# Patient Record
Sex: FEMALE | Race: WHITE | NOT HISPANIC OR LATINO | Employment: OTHER | URBAN - METROPOLITAN AREA
[De-identification: names, ages, dates, MRNs, and addresses within clinical notes are randomized per-mention and may not be internally consistent; named-entity substitution may affect disease eponyms.]

---

## 2017-01-19 ENCOUNTER — OFFICE VISIT (OUTPATIENT)
Dept: NEPHROLOGY | Facility: MEDICAL CENTER | Age: 82
End: 2017-01-19
Payer: MEDICARE

## 2017-01-19 VITALS
RESPIRATION RATE: 14 BRPM | WEIGHT: 142 LBS | DIASTOLIC BLOOD PRESSURE: 74 MMHG | HEIGHT: 59 IN | SYSTOLIC BLOOD PRESSURE: 132 MMHG | HEART RATE: 78 BPM | BODY MASS INDEX: 28.63 KG/M2 | TEMPERATURE: 97.6 F

## 2017-01-19 DIAGNOSIS — E55.9 VITAMIN D DEFICIENCY DISEASE: ICD-10-CM

## 2017-01-19 DIAGNOSIS — N18.30 CHRONIC KIDNEY DISEASE, STAGE 3, MOD DECREASED GFR (HCC): ICD-10-CM

## 2017-01-19 DIAGNOSIS — D64.9 ANEMIA, UNSPECIFIED TYPE: ICD-10-CM

## 2017-01-19 DIAGNOSIS — I10 ESSENTIAL HYPERTENSION: ICD-10-CM

## 2017-01-19 LAB
25(OH)D3+25(OH)D2 SERPL-MCNC: 27.4 NG/ML (ref 30–100)
APPEARANCE UR: ABNORMAL
BACTERIA #/AREA URNS HPF: ABNORMAL /[HPF]
BILIRUB UR QL STRIP: NEGATIVE
BUN SERPL-MCNC: 23 MG/DL (ref 10–36)
BUN/CREAT SERPL: 17 (ref 11–26)
CALCIUM SERPL-MCNC: 9.3 MG/DL (ref 8.7–10.3)
CASTS URNS MICRO: ABNORMAL
CASTS URNS QL MICRO: PRESENT /LPF
CHLORIDE SERPL-SCNC: 109 MMOL/L (ref 96–106)
CO2 SERPL-SCNC: 16 MMOL/L (ref 18–29)
COLOR UR: YELLOW
CREAT SERPL-MCNC: 1.39 MG/DL (ref 0.57–1)
CRYSTALS URNS MICRO: ABNORMAL
EPI CELLS #/AREA URNS HPF: ABNORMAL /HPF
ERYTHROCYTE [DISTWIDTH] IN BLOOD BY AUTOMATED COUNT: 15 % (ref 12.3–15.4)
GLUCOSE SERPL-MCNC: 98 MG/DL (ref 65–99)
GLUCOSE UR QL: NEGATIVE
HCT VFR BLD AUTO: 37.7 % (ref 34–46.6)
HGB BLD-MCNC: 12.1 G/DL (ref 11.1–15.9)
HGB UR QL STRIP: NEGATIVE
KETONES UR QL STRIP: NEGATIVE
LEUKOCYTE ESTERASE UR QL STRIP: ABNORMAL
MCH RBC QN AUTO: 32.5 PG (ref 26.6–33)
MCHC RBC AUTO-ENTMCNC: 32.1 G/DL (ref 31.5–35.7)
MCV RBC AUTO: 101 FL (ref 79–97)
MICRO URNS: ABNORMAL
MUCOUS THREADS URNS QL MICRO: PRESENT
NITRITE UR QL STRIP: POSITIVE
NRBC BLD AUTO-RTO: ABNORMAL %
PH UR STRIP: 6 [PH] (ref 5–7.5)
PLATELET # BLD AUTO: 228 X10E3/UL (ref 150–379)
POTASSIUM SERPL-SCNC: 3.9 MMOL/L (ref 3.5–5.2)
PROT UR QL STRIP: ABNORMAL
RBC # BLD AUTO: 3.72 X10E6/UL (ref 3.77–5.28)
RBC #/AREA URNS HPF: ABNORMAL /HPF
RENAL EPI CELLS #/AREA URNS HPF: ABNORMAL /[HPF]
SODIUM SERPL-SCNC: 143 MMOL/L (ref 134–144)
SP GR UR: 1.02 (ref 1–1.03)
T VAGINALIS URNS QL MICRO: ABNORMAL
UNIDENT CRYS URNS QL MICRO: ABNORMAL
URNS CMNT MICRO: ABNORMAL
UROBILINOGEN UR STRIP-MCNC: 0.2 MG/DL (ref 0.2–1)
WBC # BLD AUTO: 4.6 X10E3/UL (ref 3.4–10.8)
WBC #/AREA URNS HPF: >30 /HPF
YEAST #/AREA URNS HPF: ABNORMAL /[HPF]

## 2017-01-19 PROCEDURE — 99214 OFFICE O/P EST MOD 30 MIN: CPT | Performed by: INTERNAL MEDICINE

## 2017-01-19 NOTE — PROGRESS NOTES
"Subjective:      Kendra Murphy is a 89 y.o. female who presents with Follow-Up and Chronic Kidney Disease          HPI  Kendra is coming today for f/u CKD III,  HTN.  No complaints, doing well.  Good appetite and energy level.  No difficulties to urinate, no dysuria/hematuria/flank pain.  BP well controlled  Serum creatinine level stable at baseline        Review of Systems   Constitutional: Negative for fever, chills, weight loss and malaise/fatigue.   HENT: Negative for congestion, sore throat and neck pain.    Eyes: Negative for blurred vision and pain.   Respiratory: Negative for cough, hemoptysis, sputum production, shortness of breath and wheezing.    Cardiovascular: Negative for chest pain, palpitations, orthopnea, claudication, leg swelling and PND.   Gastrointestinal: Negative for nausea, vomiting, abdominal pain. Positive for diarrhea.   Genitourinary: Negative for dysuria, urgency, frequency, hematuria and flank pain.   Musculoskeletal: Positive for joint pain. Negative for myalgias and back pain.   Skin: Negative for itching and rash.   Neurological: Negative for dizziness, tremors, sensory change, focal weakness and headaches.     PMH/FH/SH/allergoes and medications reviewed     Objective:     /74 mmHg  Pulse 78  Temp(Src) 36.4 °C (97.6 °F) (Temporal)  Resp 14  Ht 1.499 m (4' 11\")  Wt 64.411 kg (142 lb)  BMI 28.67 kg/m2  LMP 01/01/1970     Physical Exam   Constitutional: She is oriented to person, place, and time. She appears well-developed and well-nourished. No distress.   HENT:   Head: Normocephalic and atraumatic.   Nose: Nose normal.   Mouth/Throat: Oropharynx is clear and moist. No oropharyngeal exudate.   Eyes: Conjunctivae and EOM are normal. Pupils are equal, round, and reactive to light. No scleral icterus.   Neck: Normal range of motion. No thyromegaly present.   Cardiovascular: Normal rate and normal heart sounds.  Exam reveals no gallop and no friction rub.  "   Pulmonary/Chest: Effort normal and breath sounds normal. No respiratory distress. She has no wheezes. She has no rales.   Abdominal: Soft. Bowel sounds are normal. She exhibits no distension and no mass. There is no tenderness.   Musculoskeletal: She exhibits no edema and no tenderness.   Lymphadenopathy:     She has no cervical adenopathy.   Neurological: She is alert and oriented to person, place, and time. No cranial nerve deficit. Coordination normal.   Skin: Skin is warm. No rash noted. She is not diaphoretic. No erythema.             Laboratory results; reviewed d/w Pt.  Lab Results   Component Value Date/Time    CREATININE 2.20* 9/27/2012 11:04 AM    CREATININE 1.9* 2/11/2009  3:50 AM    POTASSIUM 4.9 9/27/2012 11:04 AM     Lab Results   Component Value Date/Time    CREATININE 1.49* 4/10/2013  9:25 AM    CREATININE 2.20* 9/27/2012 11:04 AM    POTASSIUM 4.2 4/10/2013  9:25 AM     Lab Results   Component Value Date/Time    CREATININE 1.32* 7/30/2013 11:25 AM    CREATININE 1.49* 4/10/2013  9:25 AM    CREATININE 2.20* 9/27/2012 11:04 AM    POTASSIUM 3.8 7/30/2013 11:25 AM    POTASSIUM 4.2 4/10/2013  9:25 AM     Lab Results   Component Value Date/Time    CREATININE 1.40* 10/30/2013  2:00 PM    CREATININE 1.49* 4/10/2013  9:25 AM    CREATININE 2.20* 9/27/2012 11:04 AM    POTASSIUM 3.7 10/30/2013  2:00 PM    POTASSIUM 4.2 4/10/2013  9:25 AM       Lab Results   Component Value Date/Time    CREATININE 1.37* 3/10/2014 11:11 AM    CREATININE 1.49* 4/10/2013  9:25 AM    CREATININE 2.20* 9/27/2012 11:04 AM    POTASSIUM 3.6 3/10/2014 11:11 AM    POTASSIUM 4.2 4/10/2013  9:25 AM     Lab Results   Component Value Date/Time    CREATININE 1.53* 6/4/2014 11:53 AM    CREATININE 1.49* 4/10/2013  9:25 AM    CREATININE 2.20* 9/27/2012 11:04 AM    POTASSIUM 4.5 6/4/2014 11:53 AM    POTASSIUM 4.2 4/10/2013  9:25 AM     Lab Results   Component Value Date/Time    CREATININE 1.44* 9/26/2014 10:54 AM    CREATININE 1.49* 4/10/2013  9:25  AM    CREATININE 2.20* 9/27/2012 11:04 AM    POTASSIUM 4.2 9/26/2014 10:54 AM    POTASSIUM 4.2 4/10/2013  9:25 AM     Lab Results   Component Value Date/Time    CREATININE 1.39* 2/9/2015 11:05 AM    CREATININE 1.49* 4/10/2013  9:25 AM    CREATININE 2.20* 9/27/2012 11:04 AM    POTASSIUM 4.3 2/9/2015 11:05 AM    POTASSIUM 4.2 4/10/2013  9:25 AM     Lab Results   Component Value Date/Time    CREATININE 1.33* 6/5/2015 10:41 AM    CREATININE 1.49* 4/10/2013  9:25 AM    CREATININE 2.20* 9/27/2012 11:04 AM    POTASSIUM 4.6 6/5/2015 10:41 AM    POTASSIUM 4.2 4/10/2013  9:25 AM       Lab Results   Component Value Date/Time    CREATININE 1.39* 01/18/2017 10:03 AM    POTASSIUM 3.9 01/18/2017 10:03 AM                 Assessment:     1.CKD III -stable   2.HTN: BP well controlled  3.Electrolytes: well controlled  4.Anemia: Hb stable  5.Hyperlipidemia:advised f/u low cholesterol diet  6.Volume:euvolemic  7.Vit D def: continue supp;ementation  8.?UTI-asymptomatic    Plan:   1.Continue current treatment  2.Monitor BP and bring readings with next visit  3.Diet : low salt , low cholesterol  4.Repeat UA and urine culture  F/u in 3 months with BMP, UA

## 2017-01-19 NOTE — MR AVS SNAPSHOT
"        Kendra Murphy   2017 10:00 AM   Office Visit   MRN: [id number]    Department:  [facility]   Dept Phone:  [phone]    Description:  Female : 1926   Provider:  Caitie Orr M.D.           Reason for Visit     Follow-Up           Allergies as of 2017     Allergen Noted Reactions    Chocolate 2016   Diarrhea    .    Neomycin-Polymyxin B 02/10/2009         You were diagnosed with     Chronic kidney disease, stage 3, mod decreased GFR   [583293]       Essential hypertension   [4549949]       Vitamin D deficiency disease   [949684]       Anemia, unspecified type   [6203076]         Vital Signs     Blood Pressure Pulse Temperature Respirations Height Weight    132/74 mmHg 78 36.4 °C (97.6 °F) (Temporal) 14 1.499 m (4' 11\") 64.411 kg (142 lb)    Body Mass Index Last Menstrual Period Smoking Status             28.67 kg/m2 1970 Never Smoker          Basic Information     Date Of Birth Sex Race Ethnicity Preferred Language    1926 Female White Non- English      Your appointments     2017 11:30 AM   Follow Up Visit with Caitie Orr M.D.   [facility] ([address] [place] [address])    [facility] [address] [place] [address], [address]  [facility] [id number] [phone]           You will be receiving a confirmation call a few days before your appointment from our automated call confirmation system.              Problem List              ICD-10-CM Priority Class Noted - Resolved    Colon polyp K63.5   Unknown - Present    Osteopenia M85.80   Unknown - Present    Osteoarthritis M19.90   Unknown - Present    Acid reflux K21.9   Unknown - Present    Allergic rhinitis J30.9   2010 - Present    Rosacea L71.9   2012 - Present    Chronic kidney disease, stage 3, mod decreased GFR N18.3   2012 - Present    Anemia D64.9   2012 - Present    HTN (hypertension) I10   2012 - Present    Vitamin D deficiency disease E55.9   10/2/2014 - Present   " Lymphocytic colitis K52.832   4/24/2014 - Present      Health Maintenance        Date Due Completion Dates    PAP SMEAR [birth year]/1947 ---    IMM ZOSTER VACCINE 7/24/1986 ---    IMM DTaP/Tdap/Td Vaccine (1 - Tdap) 10/12/2011 10/11/2011    MAMMOGRAM 10/2/2015 10/2/2014 (Declined), 9/27/2012 (Declined), 5/1/2008, 5/1/2008    Override on 10/2/2014: Patient Declined    Override on 9/27/2012: Patient Declined    IMM INFLUENZA (1) 9/1/2016 10/10/2012, 10/11/2011    IMM PNEUMOCOCCAL 65+ (ADULT) LOW/MEDIUM RISK SERIES (2 of 2 - PCV13) 1/19/2017 1/19/2016    BONE DENSITY 9/27/2017 9/27/2012 (Declined), 5/1/2008    Override on 9/27/2012: Patient Declined    COLONOSCOPY 4/10/2024 4/10/2014, 11/2/2007            Current Immunizations     Influenza LAIV (Nasal) 10/10/2012    Influenza TIV (IM) 10/11/2011  1:59 PM    Pneumococcal Vaccine (UF)Historical Data 2/11/2009 11:30 AM    Pneumococcal polysaccharide vaccine (PPSV-23) 1/19/2016  6:13 PM    TD Vaccine 10/11/2011  1:55 PM      Below and/or attached are the medications your provider expects you to take. Review all of your home medications and newly ordered medications with your provider and/or pharmacist. Follow medication instructions as directed by your provider and/or pharmacist. Please keep your medication list with you and share with your provider. Update the information when medications are discontinued, doses are changed, or new medications (including over-the-counter products) are added; and carry medication information at all times in the event of emergency situations     Allergies:  CHOCOLATE - Diarrhea     NEOMYCIN-POLYMYXIN B - (reactions not documented)               Medications  Valid as of: January 19, 2017 - 10:39 AM    Generic Name Brand Name Tablet Size Instructions for use    Acetaminophen (Tab) Acetaminophen 650 MG Take 1,300 mg by mouth every 6 hours as needed. Indications: Pain        Acetaminophen-Codeine (Tab) Acetaminophen-Codeine 300-30 MG Take 1 Tab by  mouth every four hours as needed.        AmLODIPine Besylate (Tab) NORVASC 5 MG Take 1 Tab by mouth every day.        AmLODIPine Besylate (Tab) NORVASC 5 MG TAKE 1 TABLET BY MOUTH EVERY DAY        Ascorbic Acid   Take 1 Tab by mouth every day.        B Complex Vitamins   Take 1 Tab by mouth every day.        Calcium Citrate-Vitamin D   Take 1 Tab by mouth every day.        Cholestyramine Light (Pack) QUESTRAN,PREVALITE 4 GM Take 4 g by mouth every day.        Esomeprazole Magnesium (Pack) NEXIUM 40 MG Take 40 mg by mouth every day.        MetroNIDAZOLE (Gel) METROGEL 1 % Apply 1 Application to affected area(s) every day.        Potassium   Take 595 mg by mouth every day.        .                 Medicines prescribed today were sent to:     [facility]/PHARMACY #[id number] - [place], [place] - [address]    [address] [place] [address]    Phone: [phone] Fax: [phone]    Open 24 Hours?: No      Medication refill instructions:       If your prescription bottle indicates you have medication refills left, it is not necessary to call your provider’s office. Please contact your pharmacy and they will refill your medication.    If your prescription bottle indicates you do not have any refills left, you may request refills at any time through one of the following ways: The online [employer] system (except Urgent Care), by calling your provider’s office, or by asking your pharmacy to contact your provider’s office with a refill request. Medication refills are processed only during regular business hours and may not be available until the next business day. Your provider may request additional information or to have a follow-up visit with you prior to refilling your medication.   *Please Note: Medication refills are assigned a new Rx number when refilled electronically. Your pharmacy may indicate that no refills were authorized even though a new prescription for the same medication is available at the pharmacy. Please  request the medicine by name with the pharmacy before contacting your provider for a refill.        Your To Do List     Future Labs/Procedures Complete By Expires    BASIC METABOLIC PANEL  As directed 7/19/2017    URINALYSIS,CULTURE IF INDICATED  As directed 1/19/2018    URINALYSIS  As directed 7/19/2017         MyChart Access Code: Activation code not generated  Current MyChart Status: Active

## 2017-04-02 LAB
25(OH)D3+25(OH)D2 SERPL-MCNC: 27.7 NG/ML (ref 30–100)
APPEARANCE UR: ABNORMAL
BACTERIA #/AREA URNS HPF: ABNORMAL /[HPF]
BACTERIA UR CULT: ABNORMAL
BACTERIA UR CULT: ABNORMAL
BILIRUB UR QL STRIP: NEGATIVE
BUN SERPL-MCNC: 22 MG/DL (ref 10–36)
BUN/CREAT SERPL: 18 (ref 11–26)
CALCIUM SERPL-MCNC: 9.2 MG/DL (ref 8.7–10.3)
CASTS URNS MICRO: ABNORMAL
CASTS URNS QL MICRO: ABNORMAL
CHLORIDE SERPL-SCNC: 107 MMOL/L (ref 96–106)
CO2 SERPL-SCNC: 21 MMOL/L (ref 18–29)
COLOR UR: YELLOW
CREAT SERPL-MCNC: 1.22 MG/DL (ref 0.57–1)
CRYSTALS URNS MICRO: ABNORMAL
EPI CELLS #/AREA URNS HPF: ABNORMAL /HPF
GLUCOSE SERPL-MCNC: 105 MG/DL (ref 65–99)
GLUCOSE UR QL: NEGATIVE
HGB UR QL STRIP: NEGATIVE
KETONES UR QL STRIP: NEGATIVE
LEUKOCYTE ESTERASE UR QL STRIP: ABNORMAL
MICRO URNS: ABNORMAL
MICRO URNS: ABNORMAL
MUCOUS THREADS URNS QL MICRO: PRESENT
NITRITE UR QL STRIP: POSITIVE
OTHER ANTIBIOTIC SUSC ISLT: ABNORMAL
PH UR STRIP: 6 [PH] (ref 5–7.5)
POTASSIUM SERPL-SCNC: 3.8 MMOL/L (ref 3.5–5.2)
PROT UR QL STRIP: ABNORMAL
RBC #/AREA URNS HPF: ABNORMAL /HPF
RENAL EPI CELLS #/AREA URNS HPF: ABNORMAL /[HPF]
SODIUM SERPL-SCNC: 144 MMOL/L (ref 134–144)
SP GR UR: 1.03 (ref 1–1.03)
T VAGINALIS URNS QL MICRO: ABNORMAL
UNIDENT CRYS URNS QL MICRO: ABNORMAL
URINALYSIS REFLEX  377202: ABNORMAL
URNS CMNT MICRO: ABNORMAL
UROBILINOGEN UR STRIP-MCNC: 0.2 MG/DL (ref 0.2–1)
WBC #/AREA URNS HPF: >30 /HPF
YEAST #/AREA URNS HPF: ABNORMAL /[HPF]

## 2017-04-06 ENCOUNTER — OFFICE VISIT (OUTPATIENT)
Dept: NEPHROLOGY | Facility: MEDICAL CENTER | Age: 82
End: 2017-04-06
Payer: MEDICARE

## 2017-04-06 VITALS
RESPIRATION RATE: 16 BRPM | SYSTOLIC BLOOD PRESSURE: 122 MMHG | HEART RATE: 81 BPM | WEIGHT: 138 LBS | BODY MASS INDEX: 27.82 KG/M2 | OXYGEN SATURATION: 98 % | DIASTOLIC BLOOD PRESSURE: 80 MMHG | TEMPERATURE: 97.6 F | HEIGHT: 59 IN

## 2017-04-06 DIAGNOSIS — N39.0 URINARY TRACT INFECTION, SITE UNSPECIFIED: ICD-10-CM

## 2017-04-06 DIAGNOSIS — N18.30 CHRONIC KIDNEY DISEASE, STAGE 3, MOD DECREASED GFR (HCC): ICD-10-CM

## 2017-04-06 DIAGNOSIS — E55.9 VITAMIN D DEFICIENCY DISEASE: ICD-10-CM

## 2017-04-06 DIAGNOSIS — I10 ESSENTIAL HYPERTENSION: ICD-10-CM

## 2017-04-06 PROCEDURE — G8432 DEP SCR NOT DOC, RNG: HCPCS | Performed by: INTERNAL MEDICINE

## 2017-04-06 PROCEDURE — 4040F PNEUMOC VAC/ADMIN/RCVD: CPT | Performed by: INTERNAL MEDICINE

## 2017-04-06 PROCEDURE — 1036F TOBACCO NON-USER: CPT | Performed by: INTERNAL MEDICINE

## 2017-04-06 PROCEDURE — G8419 CALC BMI OUT NRM PARAM NOF/U: HCPCS | Performed by: INTERNAL MEDICINE

## 2017-04-06 PROCEDURE — 1101F PT FALLS ASSESS-DOCD LE1/YR: CPT | Mod: 8P | Performed by: INTERNAL MEDICINE

## 2017-04-06 PROCEDURE — 99214 OFFICE O/P EST MOD 30 MIN: CPT | Performed by: INTERNAL MEDICINE

## 2017-04-06 NOTE — PROGRESS NOTES
"Subjective:      Kendra Murphy is a 89 y.o. female who presents with Follow-Up and Chronic Kidney Disease          HPI  Kendra is coming today for f/u CKD III,  HTN.  C/o chronic diarrhea -awaiting GI consult  Good appetite and energy level.  No difficulties to urinate, no dysuria/hematuria/flank pain.  BP well controlled  Serum creatinine level stable at baseline        Review of Systems   Constitutional: Negative for fever, chills, weight loss and malaise/fatigue.   HENT: Negative for congestion, sore throat and neck pain.    Eyes: Negative for blurred vision and pain.   Respiratory: Negative for cough, hemoptysis, sputum production, shortness of breath and wheezing.    Cardiovascular: Negative for chest pain, palpitations, orthopnea, claudication, leg swelling and PND.   Gastrointestinal: Negative for nausea, vomiting, abdominal pain. Positive for diarrhea.   Genitourinary: Negative for dysuria, urgency, frequency, hematuria and flank pain.   Musculoskeletal: Positive for joint pain. Negative for myalgias and back pain.   Skin: Negative for itching and rash.   Neurological: Negative for dizziness, tremors, sensory change, focal weakness and headaches.     PMH/FH/SH/allergoes and medications reviewed     Objective:     /80 mmHg  Pulse 81  Temp(Src) 36.4 °C (97.6 °F)  Resp 16  Ht 1.499 m (4' 11.02\")  Wt 62.596 kg (138 lb)  BMI 27.86 kg/m2  SpO2 98%  LMP 01/01/1970     Physical Exam   Constitutional: She is oriented to person, place, and time. She appears well-developed and well-nourished. No distress.   HENT:   Head: Normocephalic and atraumatic.   Nose: Nose normal.   Mouth/Throat: Oropharynx is clear and moist. No oropharyngeal exudate.   Eyes: Conjunctivae and EOM are normal. Pupils are equal, round, and reactive to light. No scleral icterus.   Neck: Normal range of motion. No thyromegaly present.   Cardiovascular: Normal rate and normal heart sounds.  Exam reveals no gallop and no friction rub. "    Pulmonary/Chest: Effort normal and breath sounds normal. No respiratory distress. She has no wheezes. She has no rales.   Abdominal: Soft. Bowel sounds are normal. She exhibits no distension and no mass. There is no tenderness.   Musculoskeletal: She exhibits no edema and no tenderness.   Lymphadenopathy:     She has no cervical adenopathy.   Neurological: She is alert and oriented to person, place, and time. No cranial nerve deficit. Coordination normal.   Skin: Skin is warm. No rash noted. She is not diaphoretic. No erythema.             Laboratory results; reviewed d/w Pt.  Lab Results   Component Value Date/Time    CREATININE 2.20* 9/27/2012 11:04 AM    CREATININE 1.9* 2/11/2009  3:50 AM    POTASSIUM 4.9 9/27/2012 11:04 AM     Lab Results   Component Value Date/Time    CREATININE 1.49* 4/10/2013  9:25 AM    CREATININE 2.20* 9/27/2012 11:04 AM    POTASSIUM 4.2 4/10/2013  9:25 AM     Lab Results   Component Value Date/Time    CREATININE 1.32* 7/30/2013 11:25 AM    CREATININE 1.49* 4/10/2013  9:25 AM    CREATININE 2.20* 9/27/2012 11:04 AM    POTASSIUM 3.8 7/30/2013 11:25 AM    POTASSIUM 4.2 4/10/2013  9:25 AM     Lab Results   Component Value Date/Time    CREATININE 1.40* 10/30/2013  2:00 PM    CREATININE 1.49* 4/10/2013  9:25 AM    CREATININE 2.20* 9/27/2012 11:04 AM    POTASSIUM 3.7 10/30/2013  2:00 PM    POTASSIUM 4.2 4/10/2013  9:25 AM       Lab Results   Component Value Date/Time    CREATININE 1.37* 3/10/2014 11:11 AM    CREATININE 1.49* 4/10/2013  9:25 AM    CREATININE 2.20* 9/27/2012 11:04 AM    POTASSIUM 3.6 3/10/2014 11:11 AM    POTASSIUM 4.2 4/10/2013  9:25 AM     Lab Results   Component Value Date/Time    CREATININE 1.53* 6/4/2014 11:53 AM    CREATININE 1.49* 4/10/2013  9:25 AM    CREATININE 2.20* 9/27/2012 11:04 AM    POTASSIUM 4.5 6/4/2014 11:53 AM    POTASSIUM 4.2 4/10/2013  9:25 AM     Lab Results   Component Value Date/Time    CREATININE 1.44* 9/26/2014 10:54 AM    CREATININE 1.49* 4/10/2013   9:25 AM    CREATININE 2.20* 9/27/2012 11:04 AM    POTASSIUM 4.2 9/26/2014 10:54 AM    POTASSIUM 4.2 4/10/2013  9:25 AM     Lab Results   Component Value Date/Time    CREATININE 1.39* 2/9/2015 11:05 AM    CREATININE 1.49* 4/10/2013  9:25 AM    CREATININE 2.20* 9/27/2012 11:04 AM    POTASSIUM 4.3 2/9/2015 11:05 AM    POTASSIUM 4.2 4/10/2013  9:25 AM     Lab Results   Component Value Date/Time    CREATININE 1.33* 6/5/2015 10:41 AM    CREATININE 1.49* 4/10/2013  9:25 AM    CREATININE 2.20* 9/27/2012 11:04 AM    POTASSIUM 4.6 6/5/2015 10:41 AM    POTASSIUM 4.2 4/10/2013  9:25 AM       Lab Results   Component Value Date/Time    CREATININE 1.22* 03/31/2017 03:15 PM    POTASSIUM 3.8 03/31/2017 03:15 PM                 Assessment:     1.CKD III -stable   2.HTN: BP well controlled  3.Electrolytes: well controlled  4.Anemia: Hb stable  5.Hyperlipidemia:advised f/u low cholesterol diet  6.Volume:euvolemic  7.Vit D def: continue supp;ementation  8.?UTI-asymptomatic    Plan:   1.Continue current treatment  2.Monitor BP and bring readings with next visit  3.Diet : low salt , low cholesterol  4.keep well hydrated  F/u in 4 months with BMP, UA

## 2017-04-06 NOTE — MR AVS SNAPSHOT
"        Kendra Murphy   2017 11:15 AM   Office Visit   MRN: [id number]    Department:  [facility]   Dept Phone:  [phone]    Description:  Female : 1926   Provider:  Caitie Orr M.D.           Reason for Visit     Follow-Up           Allergies as of 2017     Allergen Noted Reactions    Chocolate 2016   Diarrhea    .    Neomycin-Polymyxin B 02/10/2009         You were diagnosed with     Chronic kidney disease, stage 3, mod decreased GFR   [805661]       Essential hypertension   [5393393]       Vitamin D deficiency disease   [374214]       Urinary tract infection, site unspecified   [6042112]         Vital Signs     Blood Pressure Pulse Temperature Respirations Height Weight    122/80 mmHg 81 36.4 °C (97.6 °F) 16 1.499 m (4' 11.02\") 62.596 kg (138 lb)    Body Mass Index Oxygen Saturation Last Menstrual Period Smoking Status          27.86 kg/m2 98% 1970 Never Smoker         Basic Information     Date Of Birth Sex Race Ethnicity Preferred Language    1926 Female White Non- English      Problem List              ICD-10-CM Priority Class Noted - Resolved    Colon polyp K63.5   Unknown - Present    Osteopenia M85.80   Unknown - Present    Osteoarthritis M19.90   Unknown - Present    Acid reflux K21.9   Unknown - Present    Allergic rhinitis J30.9   2010 - Present    Rosacea L71.9   2012 - Present    Chronic kidney disease, stage 3, mod decreased GFR N18.3   2012 - Present    Anemia D64.9   2012 - Present    HTN (hypertension) I10   2012 - Present    Vitamin D deficiency disease E55.9   10/2/2014 - Present    Lymphocytic colitis K52.832   2014 - Present      Health Maintenance        Date Due Completion Dates    PAP SMEAR 1947 ---    IMM ZOSTER VACCINE 1986 ---    IMM DTaP/Tdap/Td Vaccine (1 - Tdap) 10/12/2011 10/11/2011    MAMMOGRAM 10/2/2015 10/2/2014 (Declined), 2012 (Declined), 2008, 2008    Override on " 10/2/2014: Patient Declined    Override on 9/27/2012: Patient Declined    IMM PNEUMOCOCCAL 65+ (ADULT) LOW/MEDIUM RISK SERIES (2 of 2 - PCV13) 1/19/2017 1/19/2016, 2/11/2009    BONE DENSITY 9/27/2017 9/27/2012 (Declined), 5/1/2008    Override on 9/27/2012: Patient Declined    COLONOSCOPY 4/10/2024 4/10/2014, 11/2/2007            Current Immunizations     Influenza LAIV (Nasal) 10/10/2012    Influenza TIV (IM) 10/11/2011  1:59 PM    Pneumococcal polysaccharide vaccine (PPSV-23) 1/19/2016  6:13 PM, 2/11/2009 11:30 AM    TD Vaccine 10/11/2011  1:55 PM      Below and/or attached are the medications your provider expects you to take. Review all of your home medications and newly ordered medications with your provider and/or pharmacist. Follow medication instructions as directed by your provider and/or pharmacist. Please keep your medication list with you and share with your provider. Update the information when medications are discontinued, doses are changed, or new medications (including over-the-counter products) are added; and carry medication information at all times in the event of emergency situations     Allergies:  CHOCOLATE - Diarrhea     NEOMYCIN-POLYMYXIN B - (reactions not documented)               Medications  Valid as of: April 06, 2017 - 12:41 PM    Generic Name Brand Name Tablet Size Instructions for use    Acetaminophen (Tab) Acetaminophen 650 MG Take 1,300 mg by mouth every 6 hours as needed. Indications: Pain        Acetaminophen-Codeine (Tab) Acetaminophen-Codeine 300-30 MG Take 1 Tab by mouth every four hours as needed.        AmLODIPine Besylate (Tab) NORVASC 5 MG Take 1 Tab by mouth every day.        AmLODIPine Besylate (Tab) NORVASC 5 MG TAKE 1 TABLET BY MOUTH EVERY DAY        Ascorbic Acid   Take 1 Tab by mouth every day.        B Complex Vitamins   Take 1 Tab by mouth every day.        Calcium Citrate-Vitamin D   Take 1 Tab by mouth every day.        Cholestyramine Light (Pack)  QUESTRAN,PREVALITE 4 GM Take 4 g by mouth every day.        Esomeprazole Magnesium (Pack) NEXIUM 40 MG Take 40 mg by mouth every day.        MetroNIDAZOLE (Gel) METROGEL 1 % Apply 1 Application to affected area(s) every day.        Potassium   Take 595 mg by mouth every day.        .                 Medicines prescribed today were sent to:     [facility]/PHARMACY #[id number] - [place], [place] - [address]    [address] [place] [address]    Phone: [phone] Fax: [phone]    Open 24 Hours?: No      Medication refill instructions:       If your prescription bottle indicates you have medication refills left, it is not necessary to call your provider’s office. Please contact your pharmacy and they will refill your medication.    If your prescription bottle indicates you do not have any refills left, you may request refills at any time through one of the following ways: The online [employer] system (except Urgent Care), by calling your provider’s office, or by asking your pharmacy to contact your provider’s office with a refill request. Medication refills are processed only during regular business hours and may not be available until the next business day. Your provider may request additional information or to have a follow-up visit with you prior to refilling your medication.   *Please Note: Medication refills are assigned a new Rx number when refilled electronically. Your pharmacy may indicate that no refills were authorized even though a new prescription for the same medication is available at the pharmacy. Please request the medicine by name with the pharmacy before contacting your provider for a refill.        Your To Do List     Future Labs/Procedures Complete By Expires    BASIC METABOLIC PANEL  As directed 10/4/2017    URINALYSIS  As directed 10/4/2017         [employer] Access Code: Activation code not generated  Current [employer] Status: Active

## 2017-04-08 ENCOUNTER — HOSPITAL ENCOUNTER (INPATIENT)
Facility: MEDICAL CENTER | Age: 82
LOS: 1 days | DRG: 392 | End: 2017-04-09
Attending: EMERGENCY MEDICINE | Admitting: INTERNAL MEDICINE
Payer: MEDICARE

## 2017-04-08 ENCOUNTER — APPOINTMENT (OUTPATIENT)
Dept: RADIOLOGY | Facility: MEDICAL CENTER | Age: 82
DRG: 392 | End: 2017-04-08
Attending: EMERGENCY MEDICINE
Payer: MEDICARE

## 2017-04-08 ENCOUNTER — RESOLUTE PROFESSIONAL BILLING HOSPITAL PROF FEE (OUTPATIENT)
Dept: HOSPITALIST | Facility: MEDICAL CENTER | Age: 82
End: 2017-04-08
Payer: MEDICARE

## 2017-04-08 ENCOUNTER — APPOINTMENT (OUTPATIENT)
Dept: RADIOLOGY | Facility: MEDICAL CENTER | Age: 82
DRG: 392 | End: 2017-04-08
Attending: INTERNAL MEDICINE
Payer: MEDICARE

## 2017-04-08 DIAGNOSIS — K80.20 CALCULUS OF GALLBLADDER WITHOUT CHOLECYSTITIS WITHOUT OBSTRUCTION: ICD-10-CM

## 2017-04-08 DIAGNOSIS — R11.2 NAUSEA VOMITING AND DIARRHEA: ICD-10-CM

## 2017-04-08 DIAGNOSIS — R19.7 NAUSEA VOMITING AND DIARRHEA: ICD-10-CM

## 2017-04-08 DIAGNOSIS — N39.0 ACUTE UTI: ICD-10-CM

## 2017-04-08 PROBLEM — K56.609 SBO (SMALL BOWEL OBSTRUCTION) (HCC): Status: ACTIVE | Noted: 2017-04-08

## 2017-04-08 LAB
ALBUMIN SERPL BCP-MCNC: 3.9 G/DL (ref 3.2–4.9)
ALBUMIN/GLOB SERPL: 1.4 G/DL
ALP SERPL-CCNC: 50 U/L (ref 30–99)
ALT SERPL-CCNC: 15 U/L (ref 2–50)
ANION GAP SERPL CALC-SCNC: 10 MMOL/L (ref 0–11.9)
APPEARANCE UR: ABNORMAL
AST SERPL-CCNC: 36 U/L (ref 12–45)
BACTERIA #/AREA URNS HPF: ABNORMAL /HPF
BASOPHILS # BLD AUTO: 0.4 % (ref 0–1.8)
BASOPHILS # BLD: 0.02 K/UL (ref 0–0.12)
BILIRUB SERPL-MCNC: 1 MG/DL (ref 0.1–1.5)
BILIRUB UR QL STRIP.AUTO: NEGATIVE
BUN SERPL-MCNC: 21 MG/DL (ref 8–22)
CALCIUM SERPL-MCNC: 9.7 MG/DL (ref 8.4–10.2)
CHLORIDE SERPL-SCNC: 107 MMOL/L (ref 96–112)
CO2 SERPL-SCNC: 20 MMOL/L (ref 20–33)
COLOR UR: YELLOW
CREAT SERPL-MCNC: 1.35 MG/DL (ref 0.5–1.4)
CULTURE IF INDICATED INDCX: YES UA CULTURE
EOSINOPHIL # BLD AUTO: 0.05 K/UL (ref 0–0.51)
EOSINOPHIL NFR BLD: 1.1 % (ref 0–6.9)
EPI CELLS #/AREA URNS HPF: ABNORMAL /HPF
ERYTHROCYTE [DISTWIDTH] IN BLOOD BY AUTOMATED COUNT: 54.4 FL (ref 35.9–50)
GFR SERPL CREATININE-BSD FRML MDRD: 37 ML/MIN/1.73 M 2
GLOBULIN SER CALC-MCNC: 2.8 G/DL (ref 1.9–3.5)
GLUCOSE SERPL-MCNC: 134 MG/DL (ref 65–99)
GLUCOSE UR STRIP.AUTO-MCNC: NEGATIVE MG/DL
HCT VFR BLD AUTO: 37 % (ref 37–47)
HGB BLD-MCNC: 12.1 G/DL (ref 12–16)
IMM GRANULOCYTES # BLD AUTO: 0.01 K/UL (ref 0–0.11)
IMM GRANULOCYTES NFR BLD AUTO: 0.2 % (ref 0–0.9)
KETONES UR STRIP.AUTO-MCNC: NEGATIVE MG/DL
LACTATE BLD-SCNC: 0.92 MMOL/L (ref 0.5–2)
LACTATE BLD-SCNC: 1.16 MMOL/L (ref 0.5–2)
LACTATE BLD-SCNC: 2.21 MMOL/L (ref 0.5–2)
LEUKOCYTE ESTERASE UR QL STRIP.AUTO: ABNORMAL
LIPASE SERPL-CCNC: 16 U/L (ref 7–58)
LYMPHOCYTES # BLD AUTO: 1.45 K/UL (ref 1–4.8)
LYMPHOCYTES NFR BLD: 31 % (ref 22–41)
MCH RBC QN AUTO: 33.5 PG (ref 27–33)
MCHC RBC AUTO-ENTMCNC: 32.7 G/DL (ref 33.6–35)
MCV RBC AUTO: 102.5 FL (ref 81.4–97.8)
MICRO URNS: ABNORMAL
MONOCYTES # BLD AUTO: 0.3 K/UL (ref 0–0.85)
MONOCYTES NFR BLD AUTO: 6.4 % (ref 0–13.4)
MUCOUS THREADS #/AREA URNS HPF: ABNORMAL /HPF
NEUTROPHILS # BLD AUTO: 2.85 K/UL (ref 2–7.15)
NEUTROPHILS NFR BLD: 60.9 % (ref 44–72)
NITRITE UR QL STRIP.AUTO: POSITIVE
NRBC # BLD AUTO: 0 K/UL
NRBC BLD AUTO-RTO: 0 /100 WBC
PH UR STRIP.AUTO: 5.5 [PH]
PLATELET # BLD AUTO: 204 K/UL (ref 164–446)
PMV BLD AUTO: 9.6 FL (ref 9–12.9)
POTASSIUM SERPL-SCNC: 4.8 MMOL/L (ref 3.6–5.5)
PROT SERPL-MCNC: 6.7 G/DL (ref 6–8.2)
PROT UR QL STRIP: NEGATIVE MG/DL
RBC # BLD AUTO: 3.61 M/UL (ref 4.2–5.4)
RBC # URNS HPF: ABNORMAL /HPF
RBC UR QL AUTO: NEGATIVE
SODIUM SERPL-SCNC: 137 MMOL/L (ref 135–145)
SP GR UR STRIP.AUTO: 1.01
SPECIMEN DRAWN FROM PATIENT: ABNORMAL
SPECIMEN DRAWN FROM PATIENT: NORMAL
SPECIMEN DRAWN FROM PATIENT: NORMAL
WBC # BLD AUTO: 4.7 K/UL (ref 4.8–10.8)
WBC #/AREA URNS HPF: ABNORMAL /HPF

## 2017-04-08 PROCEDURE — 87086 URINE CULTURE/COLONY COUNT: CPT

## 2017-04-08 PROCEDURE — 74020 DX-ABDOMEN-2 VIEWS: CPT

## 2017-04-08 PROCEDURE — 700105 HCHG RX REV CODE 258: Performed by: INTERNAL MEDICINE

## 2017-04-08 PROCEDURE — 83690 ASSAY OF LIPASE: CPT

## 2017-04-08 PROCEDURE — 36415 COLL VENOUS BLD VENIPUNCTURE: CPT

## 2017-04-08 PROCEDURE — 96374 THER/PROPH/DIAG INJ IV PUSH: CPT | Mod: XU

## 2017-04-08 PROCEDURE — 96376 TX/PRO/DX INJ SAME DRUG ADON: CPT

## 2017-04-08 PROCEDURE — 700111 HCHG RX REV CODE 636 W/ 250 OVERRIDE (IP): Performed by: EMERGENCY MEDICINE

## 2017-04-08 PROCEDURE — 700117 HCHG RX CONTRAST REV CODE 255: Performed by: EMERGENCY MEDICINE

## 2017-04-08 PROCEDURE — 74177 CT ABD & PELVIS W/CONTRAST: CPT

## 2017-04-08 PROCEDURE — 700105 HCHG RX REV CODE 258: Performed by: EMERGENCY MEDICINE

## 2017-04-08 PROCEDURE — 99223 1ST HOSP IP/OBS HIGH 75: CPT | Mod: AI | Performed by: INTERNAL MEDICINE

## 2017-04-08 PROCEDURE — 96375 TX/PRO/DX INJ NEW DRUG ADDON: CPT

## 2017-04-08 PROCEDURE — 85025 COMPLETE CBC W/AUTO DIFF WBC: CPT

## 2017-04-08 PROCEDURE — 83605 ASSAY OF LACTIC ACID: CPT

## 2017-04-08 PROCEDURE — 700111 HCHG RX REV CODE 636 W/ 250 OVERRIDE (IP): Performed by: INTERNAL MEDICINE

## 2017-04-08 PROCEDURE — 81001 URINALYSIS AUTO W/SCOPE: CPT

## 2017-04-08 PROCEDURE — 87186 SC STD MICRODIL/AGAR DIL: CPT

## 2017-04-08 PROCEDURE — 87077 CULTURE AEROBIC IDENTIFY: CPT

## 2017-04-08 PROCEDURE — 770006 HCHG ROOM/CARE - MED/SURG/GYN SEMI*

## 2017-04-08 PROCEDURE — 80053 COMPREHEN METABOLIC PANEL: CPT

## 2017-04-08 PROCEDURE — 96361 HYDRATE IV INFUSION ADD-ON: CPT

## 2017-04-08 PROCEDURE — 99285 EMERGENCY DEPT VISIT HI MDM: CPT

## 2017-04-08 RX ORDER — MAGNESIUM GLUCONATE 30 MG(550)
1 TABLET ORAL DAILY
COMMUNITY

## 2017-04-08 RX ORDER — SODIUM CHLORIDE 9 MG/ML
INJECTION, SOLUTION INTRAVENOUS CONTINUOUS
Status: DISCONTINUED | OUTPATIENT
Start: 2017-04-08 | End: 2017-04-08

## 2017-04-08 RX ORDER — AMLODIPINE BESYLATE 5 MG/1
5 TABLET ORAL DAILY
Status: DISCONTINUED | OUTPATIENT
Start: 2017-04-08 | End: 2017-04-09 | Stop reason: HOSPADM

## 2017-04-08 RX ORDER — SODIUM CHLORIDE 9 MG/ML
INJECTION, SOLUTION INTRAVENOUS CONTINUOUS
Status: DISCONTINUED | OUTPATIENT
Start: 2017-04-08 | End: 2017-04-09 | Stop reason: HOSPADM

## 2017-04-08 RX ORDER — ONDANSETRON 2 MG/ML
4 INJECTION INTRAMUSCULAR; INTRAVENOUS ONCE
Status: COMPLETED | OUTPATIENT
Start: 2017-04-08 | End: 2017-04-08

## 2017-04-08 RX ORDER — MORPHINE SULFATE 4 MG/ML
2 INJECTION, SOLUTION INTRAMUSCULAR; INTRAVENOUS
Status: DISCONTINUED | OUTPATIENT
Start: 2017-04-08 | End: 2017-04-09 | Stop reason: HOSPADM

## 2017-04-08 RX ORDER — LORAZEPAM 2 MG/ML
0.5 INJECTION INTRAMUSCULAR EVERY 6 HOURS PRN
Status: DISCONTINUED | OUTPATIENT
Start: 2017-04-08 | End: 2017-04-09 | Stop reason: HOSPADM

## 2017-04-08 RX ORDER — LORAZEPAM 0.5 MG/1
0.5 TABLET ORAL EVERY 6 HOURS PRN
Status: DISCONTINUED | OUTPATIENT
Start: 2017-04-08 | End: 2017-04-09 | Stop reason: HOSPADM

## 2017-04-08 RX ORDER — MORPHINE SULFATE 4 MG/ML
2 INJECTION, SOLUTION INTRAMUSCULAR; INTRAVENOUS ONCE
Status: COMPLETED | OUTPATIENT
Start: 2017-04-08 | End: 2017-04-08

## 2017-04-08 RX ORDER — OMEPRAZOLE 20 MG/1
20 CAPSULE, DELAYED RELEASE ORAL DAILY
Status: DISCONTINUED | OUTPATIENT
Start: 2017-04-08 | End: 2017-04-09 | Stop reason: HOSPADM

## 2017-04-08 RX ORDER — HEPARIN SODIUM 5000 [USP'U]/ML
5000 INJECTION, SOLUTION INTRAVENOUS; SUBCUTANEOUS EVERY 8 HOURS
Status: DISCONTINUED | OUTPATIENT
Start: 2017-04-08 | End: 2017-04-09 | Stop reason: HOSPADM

## 2017-04-08 RX ORDER — ONDANSETRON 4 MG/1
4 TABLET, ORALLY DISINTEGRATING ORAL EVERY 4 HOURS PRN
Status: DISCONTINUED | OUTPATIENT
Start: 2017-04-08 | End: 2017-04-09 | Stop reason: HOSPADM

## 2017-04-08 RX ORDER — ONDANSETRON 2 MG/ML
4 INJECTION INTRAMUSCULAR; INTRAVENOUS EVERY 4 HOURS PRN
Status: DISCONTINUED | OUTPATIENT
Start: 2017-04-08 | End: 2017-04-09 | Stop reason: HOSPADM

## 2017-04-08 RX ADMIN — ONDANSETRON 4 MG: 2 INJECTION, SOLUTION INTRAMUSCULAR; INTRAVENOUS at 14:31

## 2017-04-08 RX ADMIN — HEPARIN SODIUM 5000 UNITS: 5000 INJECTION, SOLUTION INTRAVENOUS; SUBCUTANEOUS at 22:08

## 2017-04-08 RX ADMIN — SODIUM CHLORIDE: 9 INJECTION, SOLUTION INTRAVENOUS at 15:34

## 2017-04-08 RX ADMIN — MORPHINE SULFATE 2 MG: 4 INJECTION INTRAVENOUS at 14:31

## 2017-04-08 RX ADMIN — IOHEXOL 100 ML: 350 INJECTION, SOLUTION INTRAVENOUS at 12:28

## 2017-04-08 RX ADMIN — SODIUM CHLORIDE: 9 INJECTION, SOLUTION INTRAVENOUS at 10:46

## 2017-04-08 RX ADMIN — MORPHINE SULFATE 2 MG: 4 INJECTION INTRAVENOUS at 18:34

## 2017-04-08 RX ADMIN — ONDANSETRON 4 MG: 2 INJECTION, SOLUTION INTRAMUSCULAR; INTRAVENOUS at 10:47

## 2017-04-08 ASSESSMENT — PAIN SCALES - GENERAL
PAINLEVEL_OUTOF10: 6
PAINLEVEL_OUTOF10: 0
PAINLEVEL_OUTOF10: 5
PAINLEVEL_OUTOF10: 0

## 2017-04-08 ASSESSMENT — LIFESTYLE VARIABLES
ALCOHOL_USE: YES
HAVE PEOPLE ANNOYED YOU BY CRITICIZING YOUR DRINKING: NO
ON A TYPICAL DAY WHEN YOU DRINK ALCOHOL HOW MANY DRINKS DO YOU HAVE: 1
EVER_SMOKED: NEVER
EVER FELT BAD OR GUILTY ABOUT YOUR DRINKING: NO
TOTAL SCORE: 0
AVERAGE NUMBER OF DAYS PER WEEK YOU HAVE A DRINK CONTAINING ALCOHOL: 6
HOW MANY TIMES IN THE PAST YEAR HAVE YOU HAD 5 OR MORE DRINKS IN A DAY: 0
HAVE YOU EVER FELT YOU SHOULD CUT DOWN ON YOUR DRINKING: NO
TOTAL SCORE: 0
TOTAL SCORE: 0
EVER HAD A DRINK FIRST THING IN THE MORNING TO STEADY YOUR NERVES TO GET RID OF A HANGOVER: NO
CONSUMPTION TOTAL: NEGATIVE

## 2017-04-08 NOTE — IP AVS SNAPSHOT
" Home Care Instructions                                                                                                                  Name:Kendra Murphy  Medical Record Number:[id number]  CSN: [id number]    YOB: 1926   Age: 89 y.o.  Sex: female  HT:1.499 m (4' 11\") WT: 63.504 kg (140 lb)          Admit Date: 4/8/2017     Discharge Date:   Today's Date: 4/9/2017  Attending Doctor:  EBEN Fregoso*                  Allergies:  Neomycin-polymyxin b            Discharge Instructions       Diet as tolerated, drink plenty of fluids   We will have GI office call for follow up.    Discharge Instructions    Discharged to home by car with relative. Discharged via wheelchair, hospital escort: Yes.  Special equipment needed: Not Applicable    Be sure to schedule a follow-up appointment with your primary care doctor or any specialists as instructed.     Discharge Plan:   Diet Plan: Discussed  Activity Level: Discussed  Confirmed Follow up Appointment: Patient to Call and Schedule Appointment  Confirmed Symptoms Management: Discussed  Medication Reconciliation Updated: Yes  Influenza Vaccine Indication: Not indicated: Previously immunized this influenza season and > 8 years of age    I understand that a diet low in cholesterol, fat, and sodium is recommended for good health. Unless I have been given specific instructions below for another diet, I accept this instruction as my diet prescription.   Other diet: Clear liquid diet, advance diet to regular slowly and as you tolerate. Drink plenty of fluids.     Special Instructions: Diarrhea  Diarrhea is watery poop (stool). It can make you feel weak, tired, thirsty, or give you a dry mouth (signs of dehydration). Watery poop is a sign of another problem, most often an infection. It often lasts 2-3 days. It can last longer if it is a sign of something serious. Take care of yourself as told by your doctor.  HOME CARE   · Drink 1 cup (8 ounces) of fluid each time " you have watery poop.  · Do not drink the following fluids:  ¨ Those that contain simple sugars (fructose, glucose, galactose, lactose, sucrose, maltose).  ¨ Sports drinks.  ¨ Fruit juices.  ¨ Whole milk products.  ¨ Sodas.  ¨ Drinks with caffeine (coffee, tea, soda) or alcohol.  · Oral rehydration solution may be used if the doctor says it is okay. You may make your own solution. Follow this recipe:  ¨  - teaspoon table salt.  ¨ ¾ teaspoon baking soda.  ¨  teaspoon salt substitute containing potassium chloride.  ¨ 1 tablespoons sugar.  ¨ 1 liter (34 ounces) of water.  · Avoid the following foods:  ¨ High fiber foods, such as raw fruits and vegetables.  ¨ Nuts, seeds, and whole grain breads and cereals.  ¨  Those that are sweetened with sugar alcohols (xylitol, sorbitol, mannitol).  · Try eating the following foods:  ¨ Starchy foods, such as rice, toast, pasta, low-sugar cereal, oatmeal, baked potatoes, crackers, and bagels.  ¨ Bananas.  ¨ Applesauce.  · Eat probiotic-rich foods, such as yogurt and milk products that are fermented.  · Wash your hands well after each time you have watery poop.  · Only take medicine as told by your doctor.  · Take a warm bath to help lessen burning or pain from having watery poop.  GET HELP RIGHT AWAY IF:   · You cannot drink fluids without throwing up (vomiting).  · You keep throwing up.  · You have blood in your poop, or your poop looks black and tarry.  · You do not pee (urinate) in 6-8 hours, or there is only a small amount of very dark pee.  · You have belly (abdominal) pain that gets worse or stays in the same spot (localizes).  · You are weak, dizzy, confused, or light-headed.  · You have a very bad headache.  · Your watery poop gets worse or does not get better.  · You have a fever or lasting symptoms for more than 2-3 days.  · You have a fever and your symptoms suddenly get worse.  MAKE SURE YOU:   · Understand these instructions.  · Will watch your condition.  · Will get  help right away if you are not doing well or get worse.     This information is not intended to replace advice given to you by your health care provider. Make sure you discuss any questions you have with your health care provider.     Document Released: 06/05/2009 Document Revised: 01/08/2016 Document Reviewed: 08/25/2013  [employer] Interactive Patient Education ©2016 [employer]      · Is patient discharged on Warfarin / Coumadin?   No     · Is patient Post Blood Transfusion?  No    Depression / Suicide Risk    As you are discharged from this [facility] facility, it is important to learn how to keep safe from harming yourself.    Recognize the warning signs:  · Abrupt changes in personality, positive or negative- including increase in energy   · Giving away possessions  · Change in eating patterns- significant weight changes-  positive or negative  · Change in sleeping patterns- unable to sleep or sleeping all the time   · Unwillingness or inability to communicate  · Depression  · Unusual sadness, discouragement and loneliness  · Talk of wanting to die  · Neglect of personal appearance   · Rebelliousness- reckless behavior  · Withdrawal from people/activities they love  · Confusion- inability to concentrate     If you or a loved one observes any of these behaviors or has concerns about self-harm, here's what you can do:  · Talk about it- your feelings and reasons for harming yourself  · Remove any means that you might use to hurt yourself (examples: pills, rope, extension cords, firearm)  · Get professional help from the community (Mental Health, Substance Abuse, psychological counseling)  · Do not be alone:Call your Safe Contact- someone whom you trust who will be there for you.  · Call your local CRISIS HOTLINE 508-6614 or 713-847-4865  · Call your local Children's Mobile Crisis Response Team [place] (616) 001-2164 or www.[employer]  · Call the toll free National Suicide Prevention Hotlines    · National Suicide Prevention Lifeline 462-399-FOES (7309)  · [place] Hope Line Network 800-SUICIDE (681-2183)        Follow-up Information     1. Follow up with Chela Skinner M.D. In 1 week.    Specialty:  Family Medicine    Contact information    [address]  [place] [address] [phone]           Discharge Medication Instructions:    Below are the medications your physician expects you to take upon discharge:    Review all your home medications and newly ordered medications with your doctor and/or pharmacist. Follow medication instructions as directed by your doctor and/or pharmacist.    Please keep your medication list with you and share with your physician.               Medication List      CONTINUE taking these medications        Instructions    Acetaminophen 650 MG Tabs    Take 1,300 mg by mouth every 6 hours as needed. Indications: Pain   Dose:  1300 mg       amlodipine 5 MG Tabs   Last time this was given:  5 mg on 4/9/2017  8:14 AM   Commonly known as:  NORVASC    Take 1 Tab by mouth every day.   Dose:  5 mg       cholestyramine 4 GM Pack   Commonly known as:  QUESTRAN,PREVALITE    Take 4 g by mouth every day.   Dose:  4 g       METROGEL 1 % gel   Generic drug:  metronidazole    Apply 1 Application to affected area(s) every day.   Dose:  1 Application       NEXIUM 40 MG Pack   Generic drug:  esomeprazole magnesium    Take 40 mg by mouth every day.   Dose:  40 mg       Potassium Gluconate 595 MG Tabs    Take 1 Tab by mouth every day.   Dose:  1 Tab       VITAMIN B COMPLEX PO    Take 1 Tab by mouth every day.   Dose:  1 Tab               Instructions           Diet / Nutrition:    Follow any diet instructions given to you by your doctor or the dietician, including how much salt (sodium) you are allowed each day.    If you are overweight, talk to your doctor about a weight reduction plan.    Activity:    Remain physically active following your doctor's instructions about exercise and  activity.    Rest often.     Any time you become even a little tired or short of breath, SIT DOWN and rest.    Worsening Symptoms:    Report any of the following signs and symptoms to the doctor's office immediately:    *Pain of jaw, arm, or neck  *Chest pain not relieved by medication                               *Dizziness or loss of consciousness  *Difficulty breathing even when at rest   *More tired than usual                                       *Bleeding drainage or swelling of surgical site  *Swelling of feet, ankles, legs or stomach                 *Fever (>100ºF)  *Pink or blood tinged sputum  *Weight gain (3lbs/day or 5lbs /week)           *Shock from internal defibrillator (if applicable)  *Palpitations or irregular heartbeats                *Cool and/or numb extremities    Stroke Awareness    Common Risk Factors for Stroke include:    Age  Atrial Fibrillation  Carotid Artery Stenosis  Diabetes Mellitus  Excessive alcohol consumption  High blood pressure  Overweight   Physical inactivity  Smoking    Warning signs and symptoms of a stroke include:    *Sudden numbness or weakness of the face, arm or leg (especially on one side of the body).  *Sudden confusion, trouble speaking or understanding.  *Sudden trouble seeing in one or both eyes.  *Sudden trouble walking, dizziness, loss of balance or coordination.Sudden severe headache with no known cause.    It is very important to get treatment quickly when a stroke occurs. If you experience any of the above warning signs, call 911 immediately.                   Disclaimer         Quit Smoking / Tobacco Use:    I understand the use of any tobacco products increases my chance of suffering from future heart disease or stroke and could cause other illnesses which may shorten my life. Quitting the use of tobacco products is the single most important thing I can do to improve my health. For further information on smoking / tobacco cessation call a Toll Free Quit  Line at 1-353.383.5435 (*National Cancer [place]) or 1-506.670.1802 (American Lung Association) or you can access the web based program at www.lungusa.org.    [place] Tobacco Users Help Line:  (672) 612-6942       Toll Free: 1-391.357.8175  Quit Tobacco Program [facility] (121)112-2908    Crisis Hotline:    [place] Crisis Hotline:  3-230-RPVPYMW or 1-429.446.5940    [place] Crisis Hotline:    1-582.520.3476 or 657-671-4700    Discharge Survey:   Thank you for choosing [facility]. We hope we did everything we could to make your hospital stay a pleasant one. You may be receiving a phone survey and we would appreciate your time and participation in answering the questions. Your input is very valuable to us in our efforts to improve our service to our patients and their families.        My signature on this form indicates that:    1. I have reviewed and understand the above information.  2. My questions regarding this information have been answered to my satisfaction.  3. I have formulated a plan with my discharge nurse to obtain my prescribed medications for home.                  Disclaimer         __________________________________                     __________       ________                       Patient Signature                                                 Date                    Time

## 2017-04-08 NOTE — ED NOTES
Iv is patent from [facility], NS infusing. Daughter at the bedside. Labs have been sent and the plan of care discussed. Unable to give a UA at this time.

## 2017-04-08 NOTE — IP AVS SNAPSHOT
" <p align=\"LEFT\"><IMG SRC=\"//[email or web address]$/[email or web address]\" alt=\"Image\" WIDTH=\"50%\" HEIGHT=\"200\" BORDER=\"\"></p>                   Name:Kendra Murphy  Medical Record Number:[id number]  CSN: [id number]    YOB: 1926   Age: 89 y.o.  Sex: female  HT:1.499 m (4' 11\") WT: 63.504 kg (140 lb)          Admit Date: 4/8/2017     Discharge Date:   Today's Date: 4/9/2017  Attending Doctor:  EBEN Fregoso*                  Allergies:  Neomycin-polymyxin b          Follow-up Information     1. Follow up with Chela Skinner M.D. In 1 week.    Specialty:  Family Medicine    Contact information    [address] [address] [phone]           Medication List      Take these Medications        Instructions    Acetaminophen 650 MG Tabs    Take 1,300 mg by mouth every 6 hours as needed. Indications: Pain   Dose:  1300 mg       amlodipine 5 MG Tabs   Commonly known as:  NORVASC    Take 1 Tab by mouth every day.   Dose:  5 mg       cholestyramine 4 GM Pack   Commonly known as:  QUESTRAN,PREVALITE    Take 4 g by mouth every day.   Dose:  4 g       METROGEL 1 % gel   Generic drug:  metronidazole    Apply 1 Application to affected area(s) every day.   Dose:  1 Application       NEXIUM 40 MG Pack   Generic drug:  esomeprazole magnesium    Take 40 mg by mouth every day.   Dose:  40 mg       Potassium Gluconate 595 MG Tabs    Take 1 Tab by mouth every day.   Dose:  1 Tab       VITAMIN B COMPLEX PO    Take 1 Tab by mouth every day.   Dose:  1 Tab         "

## 2017-04-08 NOTE — ED NOTES
Vomited and had a bout of diarrhea this am, RLQ pain. Ate a sweet potato last night, usually not sick and hasn't vomited in 20 years. Hx of diarrhea for a long time.

## 2017-04-08 NOTE — PROGRESS NOTES
Received Bedside report. Assumed care at 0715. This pt is AOx4, ambulatory with one person assist and FWW, no N/V at this time, voiding, declines pain after intervention in ED. Patient and RN discussed plan of care including IV hydration and labs: questions answered. Labs noted, assessment complete, patient NPO with ice chips at this time. Call light in place, fall precautions in place, patient educated on importance of calling for assistance. No additional needs at this time. VSS

## 2017-04-08 NOTE — IP AVS SNAPSHOT
4/9/2017          Kendra Murphy  [address] [place] [address]    Dear Kendra:    [facility] wants to ensure your discharge home is safe and you or your loved ones have had all your questions answered regarding your care after you leave the hospital.    You may receive a telephone call within two days of your discharge.  This call is to make certain you understand your discharge instructions as well as ensure we provided you with the best care possible during your stay with us.     The call will only last approximately 3-5 minutes and will be done by a nurse.    Once again, we want to ensure your discharge home is safe and that you have a clear understanding of any next steps in your care.  If you have any questions or concerns, please do not hesitate to contact us, we are here for you.  Thank you for choosing [facility] for your healthcare needs.    Sincerely,    Raffy Baltazar    [facility]

## 2017-04-08 NOTE — ED NOTES
Pt experienced a vasal vagal reaction, Bp dropped, pt felt nauseated broke into a sweat. She was put into trendelenburg once her nausea subsided, vs BP came up. ERP to the bedside to see reaction. Cool cloth to head. Iv is infusing at this time.

## 2017-04-08 NOTE — IP AVS SNAPSHOT
Metaboli Access Code: Activation code not generated  Current Metaboli Status: Active    [employer]  A secure, online tool to manage your health information     [employer]’s Metaboli® is a secure, online tool that connects you to your personalized health information from the privacy of your home -- day or night - making it very easy for you to manage your healthcare. Once the activation process is completed, you can even access your medical information using the Metaboli donnie, which is available for free in the Apple Donnie store or Google Play store.     Metaboli provides the following levels of access (as shown below):   My Chart Features   [facility] [place] Primary Care Doctor [facility]  Specialists [facility]  Urgent  Care Non-[facility] [place]  Primary Care  Doctor   Email your healthcare team securely and privately 24/7 X X X X   Manage appointments: schedule your next appointment; view details of past/upcoming appointments X      Request prescription refills. X      View recent personal medical records, including lab and immunizations X X X X   View health record, including health history, allergies, medications X X X X   Read reports about your outpatient visits, procedures, consult and ER notes X X X X   See your discharge summary, which is a recap of your hospital and/or ER visit that includes your diagnosis, lab results, and care plan. X X       How to register for Metaboli:  1. Go to  [email or web address].  2. Click on the Sign Up Now box, which takes you to the New Member Sign Up page. You will need to provide the following information:  a. Enter your Metaboli Access Code exactly as it appears at the top of this page. (You will not need to use this code after you’ve completed the sign-up process. If you do not sign up before the expiration date, you must request a new code.)   b. Enter your date of birth.   c. Enter your home email address.   d. Click Submit, and follow the next screen’s instructions.  3. Create a Metaboli ID. This will  be your [employer] login ID and cannot be changed, so think of one that is secure and easy to remember.  4. Create a [employer] password. You can change your password at any time.  5. Enter your Password Reset Question and Answer. This can be used at a later time if you forget your password.   6. Enter your e-mail address. This allows you to receive e-mail notifications when new information is available in [employer].  7. Click Sign Up. You can now view your health information.    For assistance activating your [employer] account, call [phone]

## 2017-04-08 NOTE — ED NOTES
Still unable to give UA, warm blanket provided at pt request. Iv infusing. Pt co some abd cramping.

## 2017-04-08 NOTE — ED NOTES
The Medication Reconciliation process has been completed by interviewing the patient    Allergies have been reviewed  Antibiotic use in 30 days - NONE    [facility]

## 2017-04-08 NOTE — ED PROVIDER NOTES
ED Provider Note    CHIEF COMPLAINT  Chief Complaint   Patient presents with   • Nausea/Vomiting/Diarrhea       HPI  Kendra Murphy is a 89 y.o. female who presents complaining of nausea and vomiting that started last night. The patient reports diffuse abdominal pain and distention. She is having diarrhea as well but states that this is chronic and she is waiting to see a specialist about it. She has not had antibiotics recently. She denies any blood or mucus in her stool. She denies eating anything that tasted wrong. Her only surgery was a hysterectomy. She currently is complaining of diffuse abdominal pain. She denies vomiting any blood. She has no fevers or chills, chest pain, sore throat or shortness of breath. She has never had a colonoscopy or any sort of GI evaluation in the past. Currently, she describes her pain as 5/10 and nothing makes it better or worse    REVIEW OF SYSTEMS    HEENT:  No ear pain, congestion or sore throat   EYES: no discharge redness or vision changes  CARDIAC: no chest pain, palpitations    PULMONARY: no dyspnea, cough or congestion   GI: See history of present illness  : no dysuria, back pain or hematuria   Neuro: no weakness, numbness aphasia or headache  Musculoskeletal: no swelling deformity or pain no joint swelling  Endocrine: no fevers, sweating, weight loss   SKIN: no rash, erythema or contusions     See history of present illness all other systems are negative    PAST MEDICAL HISTORY  Past Medical History   Diagnosis Date   • Hypertension    • Osteoarthritis    • Osteopenia    • Colon polyp      adenoma   • Acid reflux    • Chronic kidney disease, stage 3, mod decreased GFR 9/27/2012       FAMILY HISTORY  Family History   Problem Relation Age of Onset   • Thyroid Sister    • Heart Disease Sister    • Genetic Mother      alzheimers in her 90's   • Cancer Mother      ? 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       Social History Main Topics   • Smoking status: Never Smoker    • Smokeless tobacco: Never Used   • Alcohol Use: Yes      Comment: rare glass of wine   • Drug Use: No   • Sexual Activity:     Partners: Male     Other Topics Concern   • Not on file     Social History Narrative       SURGICAL HISTORY  Past Surgical History   Procedure Laterality Date   • Oophorectomy     • Hysterectomy, total abdominal     • Anterior and posterior repair  1990   • Cataract extraction with iol       bilateral   • Hip orif  12/04     right       CURRENT MEDICATIONS  Home Medications     Reviewed by Rico Cruz (Pharmacy Tech) on 04/08/17 at 1106  Med List Status: Complete    Medication Last Dose Status    Acetaminophen 650 MG Tab 4/7/2017 Active    amlodipine (NORVASC) 5 MG TABS 4/7/2017 Active    B Complex Vitamins (VITAMIN B COMPLEX PO) 4/7/2017 Active    cholestyramine (QUESTRAN,PREVALITE) 4 GM Pack 4/7/2017 Active    esomeprazole magnesium (NEXIUM) 40 MG PACK 4/7/2017 Active    metronidazole (METROGEL) 1 % gel <week Active    Potassium Gluconate 595 MG Tab 4/7/2017 Active                ALLERGIES  Allergies   Allergen Reactions   • Neomycin-Polymyxin B Rash     Rxn - years ago          PHYSICAL EXAM  VITAL SIGNS: /83 mmHg  Pulse 91  Resp 16  Ht 1.499 m (4' 11\")  Wt 63.504 kg (140 lb)  BMI 28.26 kg/m2  LMP 01/01/1970  Constitutional: Well developed, Well nourished, No acute distress, Non-toxic appearance.   HEENT: Normocephalic, Atraumatic,  external ears normal, pharynx pink,  Mucous  Membranes moist, No rhinorrhea or mucosal edema  Eyes: PERRL, EOMI, Conjunctiva normal, No discharge.   Neck: Normal range of motion, No tenderness, Supple, No stridor.   Lymphatic: No lymphadenopathy    Cardiovascular: Regular Rate and Rhythm, No murmurs,  rubs, or gallops.   Thorax " & Lungs: Lungs clear to auscultation bilaterally, No respiratory distress, No wheezes, rhales or rhonchi, No chest wall tenderness.   Abdomen: Abdomen is soft but mildly distended but tympanitic. Bowel sounds and diffuse tenderness to palpation without rebound or masses  Skin: Warm, Dry, No erythema, No rash,   Back:  No CVA tenderness,  No spinal tenderness, bony crepitance step offs or instability.   Extremities: Equal, intact distal pulses, No cyanosis, clubbing or edema,  No tenderness.   Musculoskeletal: Good range of motion in all major joints. No tenderness to palpation or major deformities noted.   Neurologic: Alert & oriented x 3,  No focal deficits noted.   Psychiatric: Affect normal, Judgment normal, Mood normal.        RADIOLOGY/PROCEDURES  CT-ABDOMEN-PELVIS WITH   Final Result      1.  Dilated loops of small bowel are identified with transition point in right lower quadrant. Findings likely indicates some degree of mechanical obstruction, possibly due to adhesions.      2.  Cholelithiasis.      3.  Renal cysts are identified.               COURSE & MEDICAL DECISION MAKING  Pertinent Labs & Imaging studies reviewed. (See chart for details)  Differential diagnosis: Gastroenteritis, small bowel obstruction, pancreatitis, cholecystitis, appendicitis, UTI    Results for orders placed or performed during the hospital encounter of 04/08/17   CBC WITH DIFFERENTIAL   Result Value Ref Range    WBC 4.7 (L) 4.8 - 10.8 K/uL    RBC 3.61 (L) 4.20 - 5.40 M/uL    Hemoglobin 12.1 12.0 - 16.0 g/dL    Hematocrit 37.0 37.0 - 47.0 %    .5 (H) 81.4 - 97.8 fL    MCH 33.5 (H) 27.0 - 33.0 pg    MCHC 32.7 (L) 33.6 - 35.0 g/dL    RDW 54.4 (H) 35.9 - 50.0 fL    Platelet Count 204 164 - 446 K/uL    MPV 9.6 9.0 - 12.9 fL    Neutrophils-Polys 60.90 44.00 - 72.00 %    Lymphocytes 31.00 22.00 - 41.00 %    Monocytes 6.40 0.00 - 13.40 %    Eosinophils 1.10 0.00 - 6.90 %    Basophils 0.40 0.00 - 1.80 %    Immature Granulocytes 0.20  0.00 - 0.90 %    Nucleated RBC 0.00 /100 WBC    Neutrophils (Absolute) 2.85 2.00 - 7.15 K/uL    Lymphs (Absolute) 1.45 1.00 - 4.80 K/uL    Monos (Absolute) 0.30 0.00 - 0.85 K/uL    Eos (Absolute) 0.05 0.00 - 0.51 K/uL    Baso (Absolute) 0.02 0.00 - 0.12 K/uL    Immature Granulocytes (abs) 0.01 0.00 - 0.11 K/uL    NRBC (Absolute) 0.00 K/uL   COMP METABOLIC PANEL   Result Value Ref Range    Sodium 137 135 - 145 mmol/L    Potassium 4.8 3.6 - 5.5 mmol/L    Chloride 107 96 - 112 mmol/L    Co2 20 20 - 33 mmol/L    Anion Gap 10.0 0.0 - 11.9    Glucose 134 (H) 65 - 99 mg/dL    Bun 21 8 - 22 mg/dL    Creatinine 1.35 0.50 - 1.40 mg/dL    Calcium 9.7 8.4 - 10.2 mg/dL    AST(SGOT) 36 12 - 45 U/L    ALT(SGPT) 15 2 - 50 U/L    Alkaline Phosphatase 50 30 - 99 U/L    Total Bilirubin 1.0 0.1 - 1.5 mg/dL    Albumin 3.9 3.2 - 4.9 g/dL    Total Protein 6.7 6.0 - 8.2 g/dL    Globulin 2.8 1.9 - 3.5 g/dL    A-G Ratio 1.4 g/dL   LIPASE   Result Value Ref Range    Lipase 16 7 - 58 U/L   LACTIC ACID   Result Value Ref Range    Lactic Acid 2.21 (H) 0.50 - 2.00 mmol/L    Specimen Venous    URINALYSIS CULTURE, IF INDICATED   Result Value Ref Range    Micro Urine Req Microscopic     Color Yellow     Character Hazy (A)     Specific Gravity 1.010 <1.035    Ph 5.5 5.0-8.0    Glucose Negative Negative mg/dL    Ketones Negative Negative mg/dL    Protein Negative Negative mg/dL    Bilirubin Negative Negative    Nitrite Positive (A) Negative    Leukocyte Esterase Trace (A) Negative    Occult Blood Negative Negative    Culture Indicated Yes UA Culture   ESTIMATED GFR   Result Value Ref Range    GFR If African American 45 (A) >60 mL/min/1.73 m 2    GFR If Non  37 (A) >60 mL/min/1.73 m 2   URINE MICROSCOPIC (W/UA)   Result Value Ref Range    WBC 10-20 (A) /hpf    RBC 0-2 /hpf    Bacteria Moderate (A) None /hpf    Epithelial Cells Moderate (A) Few /hpf    Mucous Threads Few /hpf        10:51 AM An IV was started and labs were drawn and I  gave the patient Zofran for nausea and fluid for hydration since she has been having chronic diarrhea and is now vomiting    1:07 PM the patient's ct is abnormal for multiple loops of fluid-filled small bowel. She may have an ileus versus an early obstruction versus enteritis. Stool cultures will be sent. If she can provide more diarrhea and she states that she had diarrhea this morning prior to coming to the hospital. she will be admitted to the hospitalist service for IV fluids, rehydration and serial abdominal exams.    1:07 PM  I spoke with the hospitalist Dr Fernandez who has accepted the patient for admission.      FINAL IMPRESSION  1. Nausea vomiting and diarrhea    2. Calculus of gallbladder without cholecystitis without obstruction    3. Acute UTI           PLAN/DISPOSITION  admit          Electronically signed by: Lindsay Davis, 4/8/2017 10:32 AM

## 2017-04-09 VITALS
BODY MASS INDEX: 28.22 KG/M2 | HEIGHT: 59 IN | WEIGHT: 140 LBS | SYSTOLIC BLOOD PRESSURE: 145 MMHG | TEMPERATURE: 97.8 F | DIASTOLIC BLOOD PRESSURE: 61 MMHG | HEART RATE: 71 BPM | OXYGEN SATURATION: 93 % | RESPIRATION RATE: 19 BRPM

## 2017-04-09 LAB
ALBUMIN SERPL BCP-MCNC: 3 G/DL (ref 3.2–4.9)
ALBUMIN/GLOB SERPL: 1.2 G/DL
ALP SERPL-CCNC: 40 U/L (ref 30–99)
ALT SERPL-CCNC: 14 U/L (ref 2–50)
ANION GAP SERPL CALC-SCNC: 5 MMOL/L (ref 0–11.9)
AST SERPL-CCNC: 19 U/L (ref 12–45)
BASOPHILS # BLD AUTO: 0.3 % (ref 0–1.8)
BASOPHILS # BLD: 0.01 K/UL (ref 0–0.12)
BILIRUB SERPL-MCNC: 0.5 MG/DL (ref 0.1–1.5)
BUN SERPL-MCNC: 16 MG/DL (ref 8–22)
C DIFF DNA SPEC QL NAA+PROBE: NEGATIVE
C DIFF TOX GENS STL QL NAA+PROBE: NEGATIVE
CALCIUM SERPL-MCNC: 8.5 MG/DL (ref 8.4–10.2)
CHLORIDE SERPL-SCNC: 114 MMOL/L (ref 96–112)
CO2 SERPL-SCNC: 21 MMOL/L (ref 20–33)
CREAT SERPL-MCNC: 1.34 MG/DL (ref 0.5–1.4)
EOSINOPHIL # BLD AUTO: 0.06 K/UL (ref 0–0.51)
EOSINOPHIL NFR BLD: 1.6 % (ref 0–6.9)
ERYTHROCYTE [DISTWIDTH] IN BLOOD BY AUTOMATED COUNT: 55.8 FL (ref 35.9–50)
FOLATE SERPL-MCNC: 22.4 NG/ML
G LAMBLIA+C PARVUM AG STL QL RAPID: NORMAL
GFR SERPL CREATININE-BSD FRML MDRD: 37 ML/MIN/1.73 M 2
GLOBULIN SER CALC-MCNC: 2.5 G/DL (ref 1.9–3.5)
GLUCOSE SERPL-MCNC: 95 MG/DL (ref 65–99)
HCT VFR BLD AUTO: 33.6 % (ref 37–47)
HGB BLD-MCNC: 10.7 G/DL (ref 12–16)
IMM GRANULOCYTES # BLD AUTO: 0.01 K/UL (ref 0–0.11)
IMM GRANULOCYTES NFR BLD AUTO: 0.3 % (ref 0–0.9)
LYMPHOCYTES # BLD AUTO: 1.01 K/UL (ref 1–4.8)
LYMPHOCYTES NFR BLD: 26.8 % (ref 22–41)
MCH RBC QN AUTO: 33.4 PG (ref 27–33)
MCHC RBC AUTO-ENTMCNC: 31.8 G/DL (ref 33.6–35)
MCV RBC AUTO: 105 FL (ref 81.4–97.8)
MONOCYTES # BLD AUTO: 0.41 K/UL (ref 0–0.85)
MONOCYTES NFR BLD AUTO: 10.9 % (ref 0–13.4)
NEUTROPHILS # BLD AUTO: 2.27 K/UL (ref 2–7.15)
NEUTROPHILS NFR BLD: 60.1 % (ref 44–72)
NRBC # BLD AUTO: 0 K/UL
NRBC BLD AUTO-RTO: 0 /100 WBC
PLATELET # BLD AUTO: 168 K/UL (ref 164–446)
PMV BLD AUTO: 9.4 FL (ref 9–12.9)
POTASSIUM SERPL-SCNC: 3.9 MMOL/L (ref 3.6–5.5)
PROT SERPL-MCNC: 5.5 G/DL (ref 6–8.2)
RBC # BLD AUTO: 3.2 M/UL (ref 4.2–5.4)
SIGNIFICANT IND 70042: NORMAL
SITE SITE: NORMAL
SODIUM SERPL-SCNC: 140 MMOL/L (ref 135–145)
SOURCE SOURCE: NORMAL
VIT B12 SERPL-MCNC: 301 PG/ML (ref 211–911)
WBC # BLD AUTO: 3.8 K/UL (ref 4.8–10.8)
WBC STL QL MICRO: ABNORMAL

## 2017-04-09 PROCEDURE — 87329 GIARDIA AG IA: CPT

## 2017-04-09 PROCEDURE — A9270 NON-COVERED ITEM OR SERVICE: HCPCS | Performed by: INTERNAL MEDICINE

## 2017-04-09 PROCEDURE — 99239 HOSP IP/OBS DSCHRG MGMT >30: CPT | Performed by: INTERNAL MEDICINE

## 2017-04-09 PROCEDURE — 36415 COLL VENOUS BLD VENIPUNCTURE: CPT

## 2017-04-09 PROCEDURE — 87899 AGENT NOS ASSAY W/OPTIC: CPT

## 2017-04-09 PROCEDURE — 87045 FECES CULTURE AEROBIC BACT: CPT

## 2017-04-09 PROCEDURE — 700102 HCHG RX REV CODE 250 W/ 637 OVERRIDE(OP): Performed by: INTERNAL MEDICINE

## 2017-04-09 PROCEDURE — 85025 COMPLETE CBC W/AUTO DIFF WBC: CPT

## 2017-04-09 PROCEDURE — 87328 CRYPTOSPORIDIUM AG IA: CPT

## 2017-04-09 PROCEDURE — 82746 ASSAY OF FOLIC ACID SERUM: CPT

## 2017-04-09 PROCEDURE — 87493 C DIFF AMPLIFIED PROBE: CPT

## 2017-04-09 PROCEDURE — 89055 LEUKOCYTE ASSESSMENT FECAL: CPT

## 2017-04-09 PROCEDURE — 700111 HCHG RX REV CODE 636 W/ 250 OVERRIDE (IP): Performed by: INTERNAL MEDICINE

## 2017-04-09 PROCEDURE — 87046 STOOL CULTR AEROBIC BACT EA: CPT

## 2017-04-09 PROCEDURE — 80053 COMPREHEN METABOLIC PANEL: CPT

## 2017-04-09 PROCEDURE — 82607 VITAMIN B-12: CPT

## 2017-04-09 PROCEDURE — 700105 HCHG RX REV CODE 258: Performed by: INTERNAL MEDICINE

## 2017-04-09 RX ADMIN — AMLODIPINE BESYLATE 5 MG: 5 TABLET ORAL at 08:14

## 2017-04-09 RX ADMIN — HEPARIN SODIUM 5000 UNITS: 5000 INJECTION, SOLUTION INTRAVENOUS; SUBCUTANEOUS at 05:51

## 2017-04-09 RX ADMIN — SODIUM CHLORIDE: 9 INJECTION, SOLUTION INTRAVENOUS at 01:53

## 2017-04-09 RX ADMIN — OMEPRAZOLE 20 MG: 20 CAPSULE, DELAYED RELEASE ORAL at 08:14

## 2017-04-09 ASSESSMENT — PAIN SCALES - GENERAL: PAINLEVEL_OUTOF10: 0

## 2017-04-09 NOTE — PROGRESS NOTES
Assumed care of patient at 1900.  Patient is resting quietly in bed after returning from x-ray this evening.  Daughter and son at bedside.  Patient denies any needs at this time.  IV fluids infusing at 100ml/hr through PIV.  Patient remains on special isolation to rule out c-diff - awaiting stool sample.

## 2017-04-09 NOTE — H&P
CHIEF COMPLAINT:  Abdominal pain.    HISTORY OF PRESENT ILLNESS:  This is a very nice 89-year-old very healthy    female, who has been having chronic diarrhea for about a year.  She   is status post colonoscopy without significant etiology.  She is not sure if   any stool cultures or studies have been done.  She also takes Nexium for   gastroesophageal reflux disease.  Last night, she went to bed, feeling fine.    She had eaten sweet potato with butter for dinner.  Several hours later, she   woke in the middle of the night with severe abdominal pain followed by nausea   and vomiting.  This morning, she was vomiting bilious material.  She was   having chills when she was vomiting, but otherwise denies any fever or chills   or sweats.  As stated, she has had loose bowel movements for a while, but has   never had accompanying pain, nausea or vomiting with this.    PAST MEDICAL HISTORY:  Significant for arthritis and gastroesophageal reflux   disease.  She has no chronic heart disease.  She has no diabetes.    PAST SURGICAL HISTORY:  She had a remote hysterectomy.  She had repair of a   hip fracture about a year ago.    FAMILY MEDICAL HISTORY:  Positive for longevity.  There are no chronic   diseases that run in the family.    SOCIAL HISTORY:  She lives with her daughter.  She has 4 sons, one lives   locally, 2 live in [place], one in [place].  The patient does not smoke.    She drinks 1 glass of wine per night.    CODE STATUS:  Full code, but she does not want to be kept alive prolonged on   machine.    REVIEW OF SYSTEMS:  She denies any chest pain or any shortness of breath.  She   is not having any headache.  She did have some dizziness.  She denies any   dysuria.  She has not been passing much gas, which she thinks is unusual.  She   did have a loose bowel movement about 1:00 a.m.  She has not had a subsequent   one since.  She has never had prior similar symptoms to this.  She has had no   cough or  recent flu-like symptoms.    PHYSICAL EXAMINATION:  GENERAL:  This is a well-appearing female.  She appears somewhat younger than   her stated age of 89.  She is nontoxic, in no acute distress.  HEENT:  Pupils are equal and reactive.  Extraocular movements are intact.    Oropharynx is clear and moist.  NECK:  Supple.  LUNGS:  Clear.  HEART:  Regular rate and rhythm without murmurs, rubs or gallops.  ABDOMEN:  Reveals decreased bowel sounds with mildly bloody.  She has mild   diffuse tenderness.  There is no rebound, guarding, or appreciable mass.  EXTREMITIES:  Show no clubbing, cyanosis or edema.    Of note, while patient was in the emergency room, she was having some severe   pain and nausea, was given morphine and had a vasovagal episode, but her blood   pressure quickly recovered when she was placed supine and she feels fine at   this time and her pain was much relieved by morphine.  Therefore, at the time   of my exam, she may have been more tender, but this ____ by the medication.    LABORATORY STUDIES:  She has ____ white count at 4.7, which is her baseline, H   and H are 12 1 and 37, and platelet count is 204.  Differential is   unremarkable.  Chemistry profile:  Glucose is 134 and creatinine is 1.35,   which is also her baseline.  Remaining CMP parameters are unremarkable.    Lactate is borderline at 2.21.  Urinalysis shows positive nitrites, but trace   leukocyte esterase.  Patient has no urinary symptoms.  WBCs are 10-20, rbc's   0-2.  Culture has been submitted, but she is asymptomatic.  We will not treat   unless colony count is significant and no other etiology is found for her   other issues.  CT scan of the abdomen and pelvis shows numerous fluid filled   loops of bowel.  There is mild dilation of the stomach, but is not   significant.  The radiologist says dilated loops of small bowel are identified   with transition point in the right lower quadrant, possibly indicating some   degree of mechanical  obstruction, possibly secondary to adhesions.  She has   incidental gallstones and renal cyst.    ASSESSMENT:  1.  Abdominal pain, nausea, and vomiting, possible small-bowel obstruction.    Patient will be monitored.  I do not think she needs an NG tube at this time   as she has no nausea or vomiting and she does not have much gastric   distension.  We will treat her pain with morphine as needed.  We will keep her   n.p.o. except for ice chips and medications.  We will obtain followup imaging   in the morning.  2.  Abnormal urinalysis.  Patient has no urinary symptoms and we will wait for   culture results before making a decision regarding treatment.  My inclination   would not be to treat asymptomatic bacteriuria.  Should patient's symptoms   fail to resolve or should they worsen, we will consider surgical consultation   at that time.  In the meantime, we will just keep her on IV fluids until she   resolved.  Plan of care was discussed with the patient and the daughter at the   time of her admission.       ____________________________________     MD TERESA NICOLAS / FLORA    DD:  04/08/2017 17:02:00  DT:  04/08/2017 18:35:36    D#:  [id number]  Job#:  [id number]

## 2017-04-09 NOTE — PROGRESS NOTES
"Patient up to the bathroom to have large liquid brown stool.  Sample sent to lab for analysis.  Patient back to bed and is resting quietly.  Patient states \"I don't have any cramps\".  Patient remains NPO and IV fluids continue at 100ml/hr through PIV.  "

## 2017-04-09 NOTE — PROGRESS NOTES
Pt resting in bed. Eyes closed, rr even and unlabored. No signs of distress noted. Await c diff result. Will monitor.

## 2017-04-09 NOTE — DISCHARGE INSTRUCTIONS
Diet as tolerated, drink plenty of fluids   We will have GI office call for follow up.    Discharge Instructions    Discharged to home by car with relative. Discharged via wheelchair, hospital escort: Yes.  Special equipment needed: Not Applicable    Be sure to schedule a follow-up appointment with your primary care doctor or any specialists as instructed.     Discharge Plan:   Diet Plan: Discussed  Activity Level: Discussed  Confirmed Follow up Appointment: Patient to Call and Schedule Appointment  Confirmed Symptoms Management: Discussed  Medication Reconciliation Updated: Yes  Influenza Vaccine Indication: Not indicated: Previously immunized this influenza season and > 8 years of age    I understand that a diet low in cholesterol, fat, and sodium is recommended for good health. Unless I have been given specific instructions below for another diet, I accept this instruction as my diet prescription.   Other diet: Clear liquid diet, advance diet to regular slowly and as you tolerate. Drink plenty of fluids.     Special Instructions: Diarrhea  Diarrhea is watery poop (stool). It can make you feel weak, tired, thirsty, or give you a dry mouth (signs of dehydration). Watery poop is a sign of another problem, most often an infection. It often lasts 2-3 days. It can last longer if it is a sign of something serious. Take care of yourself as told by your doctor.  HOME CARE   · Drink 1 cup (8 ounces) of fluid each time you have watery poop.  · Do not drink the following fluids:  ¨ Those that contain simple sugars (fructose, glucose, galactose, lactose, sucrose, maltose).  ¨ Sports drinks.  ¨ Fruit juices.  ¨ Whole milk products.  ¨ Sodas.  ¨ Drinks with caffeine (coffee, tea, soda) or alcohol.  · Oral rehydration solution may be used if the doctor says it is okay. You may make your own solution. Follow this recipe:  ¨  - teaspoon table salt.  ¨ ¾ teaspoon baking soda.  ¨  teaspoon salt substitute containing potassium  chloride.  ¨ 1 tablespoons sugar.  ¨ 1 liter (34 ounces) of water.  · Avoid the following foods:  ¨ High fiber foods, such as raw fruits and vegetables.  ¨ Nuts, seeds, and whole grain breads and cereals.  ¨  Those that are sweetened with sugar alcohols (xylitol, sorbitol, mannitol).  · Try eating the following foods:  ¨ Starchy foods, such as rice, toast, pasta, low-sugar cereal, oatmeal, baked potatoes, crackers, and bagels.  ¨ Bananas.  ¨ Applesauce.  · Eat probiotic-rich foods, such as yogurt and milk products that are fermented.  · Wash your hands well after each time you have watery poop.  · Only take medicine as told by your doctor.  · Take a warm bath to help lessen burning or pain from having watery poop.  GET HELP RIGHT AWAY IF:   · You cannot drink fluids without throwing up (vomiting).  · You keep throwing up.  · You have blood in your poop, or your poop looks black and tarry.  · You do not pee (urinate) in 6-8 hours, or there is only a small amount of very dark pee.  · You have belly (abdominal) pain that gets worse or stays in the same spot (localizes).  · You are weak, dizzy, confused, or light-headed.  · You have a very bad headache.  · Your watery poop gets worse or does not get better.  · You have a fever or lasting symptoms for more than 2-3 days.  · You have a fever and your symptoms suddenly get worse.  MAKE SURE YOU:   · Understand these instructions.  · Will watch your condition.  · Will get help right away if you are not doing well or get worse.     This information is not intended to replace advice given to you by your health care provider. Make sure you discuss any questions you have with your health care provider.     Document Released: 06/05/2009 Document Revised: 01/08/2016 Document Reviewed: 08/25/2013  [employer] Interactive Patient Education ©2016 [employer]      · Is patient discharged on Warfarin / Coumadin?   No     · Is patient Post Blood Transfusion?  No    Depression / Suicide  Risk    As you are discharged from this [facility] facility, it is important to learn how to keep safe from harming yourself.    Recognize the warning signs:  · Abrupt changes in personality, positive or negative- including increase in energy   · Giving away possessions  · Change in eating patterns- significant weight changes-  positive or negative  · Change in sleeping patterns- unable to sleep or sleeping all the time   · Unwillingness or inability to communicate  · Depression  · Unusual sadness, discouragement and loneliness  · Talk of wanting to die  · Neglect of personal appearance   · Rebelliousness- reckless behavior  · Withdrawal from people/activities they love  · Confusion- inability to concentrate     If you or a loved one observes any of these behaviors or has concerns about self-harm, here's what you can do:  · Talk about it- your feelings and reasons for harming yourself  · Remove any means that you might use to hurt yourself (examples: pills, rope, extension cords, firearm)  · Get professional help from the community (Mental Health, Substance Abuse, psychological counseling)  · Do not be alone:Call your Safe Contact- someone whom you trust who will be there for you.  · Call your local CRISIS HOTLINE 129-8005 or 538-024-5918  · Call your local Children's Mobile Crisis Response Team [place] (182) 329-8702 or www.happyview  · Call the toll free National Suicide Prevention Hotlines   · National Suicide Prevention Lifeline 400-271-HHZB (5684)  · National Hope Line Network 800-SUICIDE (175-5822)

## 2017-04-09 NOTE — PROGRESS NOTES
Pt discharged per order into care of daughter. Discussed discharge medications, follow up appointments, when to return to the ER, activity, and pain control. Pt states understanding of teaching and asks no further questions. IV removed without difficulty. Escorted to private transportation by staff without incident.

## 2017-04-09 NOTE — PROGRESS NOTES
Pt sitting up in bed, watching television. Family at bedside. Respirations even and unlabored. Declines pain. No needs at this time.

## 2017-04-09 NOTE — CARE PLAN
Problem: Safety  Goal: Will remain free from injury  Outcome: PROGRESSING AS EXPECTED  Bed in low and locked position.  Call light in reach.  Side rails up X2.  Bed alarm remains on.  Patient encouraged to call for assistance with ambulation and uses call light appropriately.  Hourly rounding continues.    Problem: Venous Thromboembolism (VTW)/Deep Vein Thrombosis (DVT) Prevention:  Goal: Patient will participate in Venous Thrombosis (VTE)/Deep Vein Thrombosis (DVT)Prevention Measures  Outcome: PROGRESSING AS EXPECTED    04/08/17 2208   OTHER   Risk Assessment Score 4   VTE RISK High   Pharmacologic Prophylaxis Used Unfractionated Heparin

## 2017-04-09 NOTE — DISCHARGE PLANNING
Care Transition Team Assessment    Information Source  Orientation : Oriented x 4  Information Given By: Patient  Informant's Name: Leigh Rinaldi  Who is responsible for making decisions for patient? : Patient    Readmission Evaluation  Is this a readmission?: No    Elopement Risk  Legal Hold: No  Ambulatory or Self Mobile in Wheelchair: Yes  Disoriented: No  Psychiatric Symptoms: None  History of Wandering: No  Elopement this Admit: No  Vocalizing Wanting to Leave: No  Displays Behaviors, Body Language Wanting to Leave: No-Not at Risk for Elopement  Elopement Risk: Not at Risk for Elopement    Interdisciplinary Discharge Planning  Does Admitting Nurse Feel This Could be a Complex Discharge?: No  Primary Care Physician: Brody  Lives with - Patient's Self Care Capacity: Adult Children  Patient or legal guardian wants to designate a caregiver (see row info): No (has one already)  Support Systems: Children  Housing / Facility: Mobile Home  Do You Take your Prescribed Medications Regularly: Yes  Able to Return to Previous ADL's: Yes  Mobility Issues: Yes  Prior Services: Aging Services (Seniors helping senior; Caregiver Marleny Tubbs)  Patient Expects to be Discharged to:: home  Assistance Needed: Unknown at this Time  Durable Medical Equipment: Walker    Discharge Preparedness  What is your plan after discharge?: Home with help  What are your discharge supports?: Child  Prior Functional Level: Independent with Activities of Daily Living, Uses Walker  Difficulity with ADLs: Walking  Difficulty with ADLs Comment: uses walker    Functional Assesment  Prior Functional Level: Independent with Activities of Daily Living, Uses Walker    Finances  Financial Barriers to Discharge: No  Prescription Coverage: Yes    Vision / Hearing Impairment  Vision Impairment : Yes  Right Eye Vision: Impaired, Wears Glasses  Left Eye Vision: Impaired, Wears Glasses  Hearing Impairment : Yes  Hearing Impairment: Hearing Device Not  Available  Does Pt Need Special Equipment for the Hearing Impaired?: No    Values / Beliefs / Concerns  Values / Beliefs Concerns : No  Special Hospitalization Concerns: none    Advance Directive  Advance Directive?: None    Domestic Abuse  Have you ever been the victim of abuse or violence?: No  Physical Abuse or Sexual Abuse: No  Verbal Abuse or Emotional Abuse: No  Possible Abuse Reported to:: Not Applicable    Psychological Assessment  History of Substance Abuse: None  History of Psychiatric Problems: No    Discharge Risks or Barriers  Discharge risks or barriers?: No    Anticipated Discharge Information  Anticipated discharge disposition: Home

## 2017-04-10 ENCOUNTER — PATIENT OUTREACH (OUTPATIENT)
Dept: HEALTH INFORMATION MANAGEMENT | Facility: OTHER | Age: 82
End: 2017-04-10

## 2017-04-10 LAB
BACTERIA UR CULT: ABNORMAL
BACTERIA UR CULT: ABNORMAL
E COLI SXT1+2 STL IA: NORMAL
SIGNIFICANT IND 70042: ABNORMAL
SIGNIFICANT IND 70042: NORMAL
SITE SITE: ABNORMAL
SITE SITE: NORMAL
SOURCE SOURCE: ABNORMAL
SOURCE SOURCE: NORMAL

## 2017-04-11 NOTE — DISCHARGE SUMMARY
CHIEF COMPLAINT ON ADMISSION  Chief Complaint   Patient presents with   • Nausea/Vomiting/Diarrhea       CODE STATUS  Prior    HPI & HOSPITAL COURSE  his is a very nice 89-year-old very healthy     female, who has been having chronic diarrhea for about a year.  She    is status post colonoscopy without significant etiology.  She is not sure if    any stool cultures or studies have been done.  She also takes Nexium for    gastroesophageal reflux disease.  Last night, she went to bed, feeling fine.     She had eaten sweet potato with butter for dinner.  Several hours later, she    woke in the middle of the night with severe abdominal pain followed by nausea    and vomiting.  This morning, she was vomiting bilious material.  She was    having chills when she was vomiting, but otherwise denies any fever or chills    or sweats.  As stated, she has had loose bowel movements for a while, but has    never had accompanying pain, nausea or vomiting with this. CT was concerning for possible SBO, she was admitted on prn morphine for pain, as she was no longer vomiting and there was not significant gastric distention, we did not place NG tube. The following day she had had 3 interval loose BM's and was tolerating diet without pain, Nausea or vomiting. Stool WBC weakly +, c dif was negative. She felt well enough to go home. As she turned out not to be obstructed, she stayed shorter than anticipated.     Therefore, she is discharged in good and stable condition with close outpatient follow-up.    SPECIFIC OUTPATIENT FOLLOW-UP  [facility]    DISCHARGE PROBLEM LIST  Active Problems:    Acid reflux POA: Yes    Chronic kidney disease, stage 3, mod decreased GFR POA: Yes    HTN (hypertension) POA: Yes    SBO (small bowel obstruction) (CMS-HCC) POA: Unknown  Resolved Problems:    * No resolved hospital problems. *      FOLLOW UP  No future appointments.  Chela Skinner M.D.  [address] [facility]  [address]  [facility] [id number]  [phone]    In 1 week        MEDICATIONS ON DISCHARGE   Kendra Murphy   Home Medication Instructions PHYLLIS:[id number]    Printed on:04/10/17 2008   Medication Information                      Acetaminophen 650 MG Tab  Take 1,300 mg by mouth every 6 hours as needed. Indications: Pain             amlodipine (NORVASC) 5 MG TABS  Take 1 Tab by mouth every day.             B Complex Vitamins (VITAMIN B COMPLEX PO)  Take 1 Tab by mouth every day.             cholestyramine (QUESTRAN,PREVALITE) 4 GM Pack  Take 4 g by mouth every day.             esomeprazole magnesium (NEXIUM) 40 MG PACK  Take 40 mg by mouth every day.             metronidazole (METROGEL) 1 % gel  Apply 1 Application to affected area(s) every day.             Potassium Gluconate 595 MG Tab  Take 1 Tab by mouth every day.                 DIET  As tolerated  ACTIVITY  As tolerated.      CONSULTATIONS  none    PROCEDURES  none    LABORATORY  Lab Results   Component Value Date/Time    SODIUM 140 04/09/2017 05:34 AM    POTASSIUM 3.9 04/09/2017 05:34 AM    CHLORIDE 114* 04/09/2017 05:34 AM    CO2 21 04/09/2017 05:34 AM    GLUCOSE 95 04/09/2017 05:34 AM    BUN 16 04/09/2017 05:34 AM    CREATININE 1.34 04/09/2017 05:34 AM        Lab Results   Component Value Date/Time    WBC 3.8* 04/09/2017 05:34 AM    HEMOGLOBIN 10.7* 04/09/2017 05:34 AM    HEMATOCRIT 33.6* 04/09/2017 05:34 AM    PLATELET COUNT 168 04/09/2017 05:34 AM        Total time of the discharge process exceeds 36 minutes.

## 2017-04-12 LAB
BACTERIA STL CULT: NORMAL
E COLI SXT1+2 STL IA: NORMAL
SIGNIFICANT IND 70042: NORMAL
SITE SITE: NORMAL
SOURCE SOURCE: NORMAL

## 2017-10-19 DIAGNOSIS — N18.30 CHRONIC KIDNEY DISEASE, STAGE III (MODERATE) (HCC): ICD-10-CM

## 2017-10-23 RX ORDER — AMLODIPINE BESYLATE 5 MG/1
TABLET ORAL
Qty: 90 TAB | Refills: 3 | Status: SHIPPED | OUTPATIENT
Start: 2017-10-23

## 2019-09-15 ENCOUNTER — HOSPITAL ENCOUNTER (EMERGENCY)
Facility: MEDICAL CENTER | Age: 84
End: 2019-09-15
Attending: EMERGENCY MEDICINE
Payer: MEDICARE

## 2019-09-15 VITALS
BODY MASS INDEX: 27.47 KG/M2 | HEART RATE: 93 BPM | HEIGHT: 59 IN | WEIGHT: 136.24 LBS | OXYGEN SATURATION: 95 % | RESPIRATION RATE: 18 BRPM | SYSTOLIC BLOOD PRESSURE: 138 MMHG | DIASTOLIC BLOOD PRESSURE: 96 MMHG | TEMPERATURE: 98 F

## 2019-09-15 DIAGNOSIS — B02.9 HERPES ZOSTER WITHOUT COMPLICATION: ICD-10-CM

## 2019-09-15 PROCEDURE — 700102 HCHG RX REV CODE 250 W/ 637 OVERRIDE(OP): Performed by: EMERGENCY MEDICINE

## 2019-09-15 PROCEDURE — 99283 EMERGENCY DEPT VISIT LOW MDM: CPT

## 2019-09-15 PROCEDURE — A9270 NON-COVERED ITEM OR SERVICE: HCPCS | Performed by: EMERGENCY MEDICINE

## 2019-09-15 RX ORDER — VALACYCLOVIR HYDROCHLORIDE 500 MG/1
1000 TABLET, FILM COATED ORAL ONCE
Status: COMPLETED | OUTPATIENT
Start: 2019-09-15 | End: 2019-09-15

## 2019-09-15 RX ORDER — VALACYCLOVIR HYDROCHLORIDE 1 G/1
1000 TABLET, FILM COATED ORAL 3 TIMES DAILY
Qty: 21 TAB | Refills: 0 | Status: SHIPPED | OUTPATIENT
Start: 2019-09-15 | End: 2019-09-22

## 2019-09-15 RX ADMIN — VALACYCLOVIR HYDROCHLORIDE 1000 MG: 500 TABLET, FILM COATED ORAL at 14:10

## 2019-09-15 NOTE — ED PROVIDER NOTES
[facility]    ED Provider Note    CHIEF COMPLAINT   Chief Complaint   Patient presents with   • Rash     Rt side of scalp and neck.   • Tachycardia     103BPM         HPI   Kendra Murphy is a 89 y.o. female who presents to the ED with a rash.  She started noticing the rash last night, was having difficulty sleeping due to it.  The rashes on the right side of the scalp goes throughout the right anterior and posterior aspect of the neck.  She states it is painful, no fevers, cough, runny nose, congestion.  Apparently the patient is slightly tachycardic.  No abdominal pains, nausea vomiting.    REVIEW OF SYSTEMS   See HPI for further details    PAST MEDICAL HISTORY   Past Medical History:   Diagnosis Date   • Acid reflux    • Chronic kidney disease, stage 3, mod decreased GFR (HCC) 9/27/2012   • Colon polyp     adenoma   • Hypertension    • Osteoarthritis    • Osteopenia        FAMILY HISTORY  Family History   Problem Relation Age of Onset   • Thyroid Sister    • Heart Disease Sister    • Genetic Disorder Mother         alzheimers in her 90's   • Cancer Mother         ? 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      Employer: Retired   Social Needs   • Financial resource strain: Not on file   • Food insecurity:     Worry: Not on file     Inability: Not on file   • Transportation needs:     Medical: Not on file     Non-medical: Not on file   Tobacco Use   • Smoking status: Never Smoker   • Smokeless tobacco: Never Used   Substance and Sexual Activity   • Alcohol use: Yes     Comment: rare glass of wine   • Drug use: No   • Sexual activity: Not Currently     Partners: Male   Lifestyle   • Physical activity:     Days per week: Not on file     Minutes per session: Not  "on file   • Stress: Not on file   Relationships   • Social connections:     Talks on phone: Not on file     Gets together: Not on file     Attends Yazidi service: Not on file     Active member of club or organization: Not on file     Attends meetings of clubs or organizations: Not on file     Relationship status: Not on file   • Intimate partner violence:     Fear of current or ex partner: Not on file     Emotionally abused: Not on file     Physically abused: Not on file     Forced sexual activity: Not on file   Other Topics Concern   • Not on file   Social History Narrative   • Not on file        SURGICAL HISTORY  Past Surgical History:   Procedure Laterality Date   • HIP ORIF  12/04    right   • ANTERIOR AND POSTERIOR REPAIR  1990   • CATARACT EXTRACTION WITH IOL      bilateral   • HYSTERECTOMY, TOTAL ABDOMINAL     • OOPHORECTOMY         CURRENT MEDICATIONS   Home Medications     Reviewed by Silvia Mccoy R.N. (Registered Nurse) on 09/15/19 at 1309  Med List Status: Not Addressed   Medication Last Dose Status   Acetaminophen 650 MG Tab  Active   amlodipine (NORVASC) 5 MG Tab  Active   amlodipine (NORVASC) 5 MG TABS  Active   B Complex Vitamins (VITAMIN B COMPLEX PO)  Active   cholestyramine (QUESTRAN,PREVALITE) 4 GM Pack  Active   esomeprazole magnesium (NEXIUM) 40 MG PACK  Active   metronidazole (METROGEL) 1 % gel  Active   Potassium Gluconate 595 MG Tab  Active                ALLERGIES   Allergies   Allergen Reactions   • Neomycin-Polymyxin B Rash     Rxn - years ago          PHYSICAL EXAM  VITAL SIGNS: /85   Pulse (!) 104   Temp 36.7 °C (98.1 °F) (Temporal)   Resp 17   Ht 1.499 m (4' 11\")   Wt 61.8 kg (136 lb 3.9 oz)   LMP 01/01/1970   SpO2 95%   BMI 27.52 kg/m²   Constitutional: Well developed, Well nourished, mild distress, Non-toxic appearance.   HENT: Atraumatic, Normocephalic, Oral pharynx with moist mucous membranes.  Eyes: EOMI, PERRL  Cardiovascular: Good pulses  Thorax & Lungs: " No respiratory distress  Skin: Erythematous painful rash on the right posterior scalp going down to the right lateral neck, no vesicles, some hyperesthesias  Extremities: No gross deformity seen  Neuro: Awake, alert, moved all extremities spontaneously and purposefully      COURSE & MEDICAL DECISION MAKING  Pertinent Labs & Imaging studies reviewed. (See chart for details)  Patient with likely shingles, but the patient on Valtrex, she does not appear to be toxic, she is slightly tachycardic likely secondary to shingles.  Patient will be discharged home, instructed her to return with worsening symptoms.  Tylenol for pain.        FINAL IMPRESSION  1. Herpes zoster without complication    .    Patient referred to primary care provider for blood pressure management    This dictation was created using voice recognition software. The accuracy of the dictation is limited to the abilities of the software. I expect there may be some errors of grammar and possibly content. The nursing notes were reviewed and certain aspects of this information were incorporated into this note.    Electronically signed by: Malvin Randolph, 9/15/2019 1:52 PM

## 2019-09-15 NOTE — ED NOTES
PO med given per order and taken well    Cleared for d/c  Pt and daughters given dischg instructions  Verbally understands  D/c'ed to home in NAD  Rx valtrex given and family aware to fill and have pt take as prescribed

## 2019-09-15 NOTE — ED TRIAGE NOTES
Chief Complaint   Patient presents with   • Rash     Rt side of scalp and neck.   • Tachycardia     103BPM

## 2021-01-13 DIAGNOSIS — Z23 NEED FOR VACCINATION: ICD-10-CM
